# Patient Record
Sex: FEMALE | Race: WHITE | Employment: OTHER | URBAN - METROPOLITAN AREA
[De-identification: names, ages, dates, MRNs, and addresses within clinical notes are randomized per-mention and may not be internally consistent; named-entity substitution may affect disease eponyms.]

---

## 2019-10-02 ENCOUNTER — HOSPITAL ENCOUNTER (OUTPATIENT)
Dept: PHYSICAL THERAPY | Age: 44
Setting detail: THERAPIES SERIES
Discharge: HOME | End: 2019-10-02
Payer: COMMERCIAL

## 2019-10-02 PROCEDURE — 97162 PT EVAL MOD COMPLEX 30 MIN: CPT

## 2019-10-02 PROCEDURE — 97530 THERAPEUTIC ACTIVITIES: CPT

## 2019-10-02 PROCEDURE — 97140 MANUAL THERAPY 1/> REGIONS: CPT

## 2019-10-16 ENCOUNTER — HOSPITAL ENCOUNTER (OUTPATIENT)
Dept: PHYSICAL THERAPY | Age: 44
Setting detail: THERAPIES SERIES
Discharge: HOME | End: 2019-10-16
Payer: COMMERCIAL

## 2019-10-16 PROCEDURE — 97110 THERAPEUTIC EXERCISES: CPT

## 2019-10-16 PROCEDURE — 97112 NEUROMUSCULAR REEDUCATION: CPT

## 2019-10-16 PROCEDURE — 97140 MANUAL THERAPY 1/> REGIONS: CPT

## 2019-10-21 ENCOUNTER — HOSPITAL ENCOUNTER (OUTPATIENT)
Dept: PHYSICAL THERAPY | Age: 44
Setting detail: THERAPIES SERIES
Discharge: HOME | End: 2019-10-21
Payer: COMMERCIAL

## 2019-10-21 PROCEDURE — 97110 THERAPEUTIC EXERCISES: CPT

## 2019-10-21 PROCEDURE — 97112 NEUROMUSCULAR REEDUCATION: CPT

## 2019-10-21 PROCEDURE — 97140 MANUAL THERAPY 1/> REGIONS: CPT

## 2019-10-23 ENCOUNTER — HOSPITAL ENCOUNTER (OUTPATIENT)
Dept: PHYSICAL THERAPY | Age: 44
Setting detail: THERAPIES SERIES
Discharge: HOME | End: 2019-10-23
Payer: COMMERCIAL

## 2019-10-28 ENCOUNTER — APPOINTMENT (OUTPATIENT)
Dept: PHYSICAL THERAPY | Age: 44
End: 2019-10-28
Payer: COMMERCIAL

## 2019-10-30 ENCOUNTER — HOSPITAL ENCOUNTER (OUTPATIENT)
Dept: PHYSICAL THERAPY | Age: 44
Setting detail: THERAPIES SERIES
Discharge: HOME | End: 2019-10-30
Payer: COMMERCIAL

## 2019-10-30 PROCEDURE — 97110 THERAPEUTIC EXERCISES: CPT

## 2019-10-30 PROCEDURE — 97140 MANUAL THERAPY 1/> REGIONS: CPT

## 2019-10-30 PROCEDURE — 97112 NEUROMUSCULAR REEDUCATION: CPT

## 2019-11-04 ENCOUNTER — APPOINTMENT (OUTPATIENT)
Dept: PHYSICAL THERAPY | Age: 44
End: 2019-11-04
Payer: COMMERCIAL

## 2019-11-06 ENCOUNTER — HOSPITAL ENCOUNTER (OUTPATIENT)
Dept: PHYSICAL THERAPY | Age: 44
Setting detail: THERAPIES SERIES
Discharge: HOME | End: 2019-11-06
Payer: COMMERCIAL

## 2019-11-06 PROCEDURE — 97112 NEUROMUSCULAR REEDUCATION: CPT

## 2019-11-06 PROCEDURE — 97110 THERAPEUTIC EXERCISES: CPT

## 2019-11-06 PROCEDURE — 97140 MANUAL THERAPY 1/> REGIONS: CPT

## 2019-11-11 ENCOUNTER — APPOINTMENT (OUTPATIENT)
Dept: PHYSICAL THERAPY | Age: 44
End: 2019-11-11
Payer: COMMERCIAL

## 2019-11-13 ENCOUNTER — APPOINTMENT (OUTPATIENT)
Dept: PHYSICAL THERAPY | Age: 44
End: 2019-11-13
Payer: COMMERCIAL

## 2019-11-18 ENCOUNTER — HOSPITAL ENCOUNTER (OUTPATIENT)
Dept: PHYSICAL THERAPY | Age: 44
Setting detail: THERAPIES SERIES
Discharge: HOME | End: 2019-11-18
Payer: COMMERCIAL

## 2019-11-18 PROCEDURE — 97110 THERAPEUTIC EXERCISES: CPT

## 2019-11-18 PROCEDURE — 97140 MANUAL THERAPY 1/> REGIONS: CPT

## 2019-11-18 PROCEDURE — 97112 NEUROMUSCULAR REEDUCATION: CPT

## 2019-11-20 ENCOUNTER — HOSPITAL ENCOUNTER (OUTPATIENT)
Dept: PHYSICAL THERAPY | Age: 44
Setting detail: THERAPIES SERIES
Discharge: HOME | End: 2019-11-20
Payer: COMMERCIAL

## 2019-11-20 PROCEDURE — 97140 MANUAL THERAPY 1/> REGIONS: CPT

## 2019-11-20 PROCEDURE — 97112 NEUROMUSCULAR REEDUCATION: CPT

## 2019-11-20 PROCEDURE — 97110 THERAPEUTIC EXERCISES: CPT

## 2019-11-25 ENCOUNTER — HOSPITAL ENCOUNTER (OUTPATIENT)
Dept: PHYSICAL THERAPY | Age: 44
Setting detail: THERAPIES SERIES
Discharge: HOME | End: 2019-11-25
Payer: COMMERCIAL

## 2019-11-25 PROCEDURE — 97140 MANUAL THERAPY 1/> REGIONS: CPT

## 2019-11-25 PROCEDURE — 97110 THERAPEUTIC EXERCISES: CPT

## 2019-11-25 PROCEDURE — 97112 NEUROMUSCULAR REEDUCATION: CPT

## 2019-12-02 ENCOUNTER — HOSPITAL ENCOUNTER (OUTPATIENT)
Dept: PHYSICAL THERAPY | Age: 44
Setting detail: THERAPIES SERIES
Discharge: HOME | End: 2019-12-02
Payer: COMMERCIAL

## 2019-12-02 PROCEDURE — 97110 THERAPEUTIC EXERCISES: CPT

## 2019-12-02 PROCEDURE — G0283 ELEC STIM OTHER THAN WOUND: HCPCS

## 2019-12-02 PROCEDURE — 97140 MANUAL THERAPY 1/> REGIONS: CPT

## 2019-12-02 PROCEDURE — 97112 NEUROMUSCULAR REEDUCATION: CPT

## 2019-12-04 ENCOUNTER — APPOINTMENT (OUTPATIENT)
Dept: PHYSICAL THERAPY | Age: 44
End: 2019-12-04
Payer: COMMERCIAL

## 2019-12-06 ENCOUNTER — HOSPITAL ENCOUNTER (OUTPATIENT)
Dept: PHYSICAL THERAPY | Age: 44
Setting detail: THERAPIES SERIES
Discharge: HOME | End: 2019-12-06
Payer: COMMERCIAL

## 2019-12-06 PROCEDURE — 97110 THERAPEUTIC EXERCISES: CPT

## 2019-12-06 PROCEDURE — 97112 NEUROMUSCULAR REEDUCATION: CPT

## 2019-12-06 PROCEDURE — 97140 MANUAL THERAPY 1/> REGIONS: CPT

## 2019-12-06 PROCEDURE — G0283 ELEC STIM OTHER THAN WOUND: HCPCS

## 2019-12-09 ENCOUNTER — HOSPITAL ENCOUNTER (OUTPATIENT)
Dept: PHYSICAL THERAPY | Age: 44
Setting detail: THERAPIES SERIES
Discharge: HOME | End: 2019-12-09
Payer: COMMERCIAL

## 2019-12-09 PROCEDURE — 97140 MANUAL THERAPY 1/> REGIONS: CPT

## 2019-12-09 PROCEDURE — 97110 THERAPEUTIC EXERCISES: CPT

## 2019-12-09 PROCEDURE — 97112 NEUROMUSCULAR REEDUCATION: CPT

## 2019-12-11 ENCOUNTER — HOSPITAL ENCOUNTER (OUTPATIENT)
Dept: PHYSICAL THERAPY | Age: 44
Setting detail: THERAPIES SERIES
Discharge: HOME | End: 2019-12-11
Payer: COMMERCIAL

## 2019-12-11 PROCEDURE — 97112 NEUROMUSCULAR REEDUCATION: CPT

## 2019-12-11 PROCEDURE — 97110 THERAPEUTIC EXERCISES: CPT

## 2019-12-11 PROCEDURE — 97140 MANUAL THERAPY 1/> REGIONS: CPT

## 2019-12-16 ENCOUNTER — HOSPITAL ENCOUNTER (OUTPATIENT)
Dept: PHYSICAL THERAPY | Age: 44
Setting detail: THERAPIES SERIES
Discharge: HOME | End: 2019-12-16
Payer: COMMERCIAL

## 2020-01-07 ENCOUNTER — APPOINTMENT (OUTPATIENT)
Dept: PHYSICAL THERAPY | Age: 45
End: 2020-01-07
Payer: COMMERCIAL

## 2020-01-09 ENCOUNTER — HOSPITAL ENCOUNTER (OUTPATIENT)
Dept: PHYSICAL THERAPY | Age: 45
Setting detail: THERAPIES SERIES
Discharge: HOME | End: 2020-01-09
Payer: COMMERCIAL

## 2020-01-09 PROCEDURE — 97140 MANUAL THERAPY 1/> REGIONS: CPT

## 2020-01-09 PROCEDURE — 97112 NEUROMUSCULAR REEDUCATION: CPT

## 2020-01-09 PROCEDURE — 97530 THERAPEUTIC ACTIVITIES: CPT

## 2020-01-09 NOTE — FLOWSHEET NOTE
control in self care, mobility, lifting, ambulation and eccentric single leg control. [] UE / cervical: cervical, scapular, scapulothoracic and UE control with self care, reaching, carrying, lifting, house/yardwork, driving, computer work. NMR and Therapeutic Activities:    [x] (75379 or 92357) Provided verbal/tactile cueing for activities related to improving balance, coordination, kinesthetic sense, posture, motor skill, proprioception and motor activation to allow for proper function of   [] LE: / Lumbar core, proximal hip and LE with self care and ADLs  [x] UE / Cervical: cervical, postural, scapular, scapulothoracic and UE control with self care, carrying, lifting, driving, computer work.   [] (78502) Gait Re-education- Provided training and instruction to the patient for proper LE, core and proximal hip recruitment and positioning and eccentric body weight control with ambulation re-education including up and down stairs     Home Management Training / Self Care:  [] (26032) Provided self-care/home management training related to activities of daily living and compensatory training, and/or use of adaptive equipment for improvement with: ADLs and compensatory training, meal preparation, safety procedures and instruction in use of adaptive equipment, including bathing, grooming, dressing, personal hygiene, basic household cleaning and chores.      Home Exercise Program:    [x] (81766) Reviewed/Progressed HEP activities related to strengthening, flexibility, endurance, ROM of   [] LE / Lumbar: core, proximal hip and LE for functional self-care, mobility, lifting and ambulation/stair navigation   [x] UE / Cervical: cervical, postural, scapular, scapulothoracic and UE control with self care, reaching, carrying, lifting, house/yardwork, driving, computer work  [] (31056)Reviewed/Progressed HEP activities related to improving balance, coordination, kinesthetic sense, posture, motor skill, proprioception of   [] bending   []ADLs []Reaching  []Lifting  []Housework  []Driving []Job related tasks  []Sports/Recreation [x]Other: position changes, head movements      ASSESSMENT:  Patient able to resume Habituation activity with mild to moderate dizziness and head pressure. Pt admitted to not performing Otilith/Habituation stimulation while she was at home. Pt will continue to benefit from graded increase  In challenge with balance,manual therapy and Habituation activity. Treatment/Activity Tolerance:  [x] Patient able to complete tx  [] Patient limited by fatigue  [] Patient limited by pain  [] Patient limited by other medical complications  [x] Other: 12/2 pt with increase in pain today, did trial of estim with cp    Prognosis: [x] Good [] Fair  [] Poor    Patient Requires Follow-up: [x] Yes  [] No    Plan for next treatment session: habituation, head movement, manual therapy, cervical ROM & proprioception    PLAN: PT 2x / week for 6 weeks. Request 2x/week for 6 more weeks to meet goals  [x] Continue per plan of care [] Alter current plan (see comments)  [] Plan of care initiated [] Hold pending MD visit [] Discharge    Electronically signed by: Christine Peterson PT, DPT [id number]     Note: If patient does not return for scheduled/ recommended follow up visits, his note will serve as a discharge from care along with most recent update on progress.

## 2020-01-14 ENCOUNTER — HOSPITAL ENCOUNTER (OUTPATIENT)
Dept: PHYSICAL THERAPY | Age: 45
Setting detail: THERAPIES SERIES
Discharge: HOME | End: 2020-01-14
Payer: COMMERCIAL

## 2020-01-14 PROCEDURE — 97112 NEUROMUSCULAR REEDUCATION: CPT

## 2020-01-14 PROCEDURE — 97110 THERAPEUTIC EXERCISES: CPT

## 2020-01-14 PROCEDURE — 97140 MANUAL THERAPY 1/> REGIONS: CPT

## 2020-01-14 NOTE — FLOWSHEET NOTE
[facility] - Outpatient Physical Therapy              Physical Therapy Daily Treatment Note    Date:  2020    Patient Name:  Scott King    :  1975  MRN: [id number]  Medical/Treatment Diagnosis Information:  · Diagnosis: Cervical Craniosyndrome   · Treatment Diagnosis: Cervicalgia, Post-traumatic headache   Insurance/Certification information:  PT Insurance Information: [address]   Physician Information:  Referring Practitioner: Edi Lyons MD   Plan of care signed (Y/N): []  Yes [x]  No refaxed 19, refaxed , ,  faxed to number pt brought in see below  PT printed POC and PN for pt to take to MD appt for signature   pt brought contact info for MD phone [phone], fax [phone], Dept of Neuro, [address] [place] [address] [place], [address] [place] [address]    Date of Patient follow up with Physician:  19     Progress Report: []  Yes  [x]  No     Date Range for reporting period:   Beginning 10/2/19  Ending 19    Progress report due (10 Rx/or 30 days whichever is less): 20    Recertification due (POC duration/ or 90 days whichever is less):    Visit # Insurance Allowable Auth required? Date Range   3/12 2/30 []  Yes  [x]  No NA     Latex Allergy:  [x]NO      []YES  Preferred Language for Healthcare:   [x]English       []other:    Functional Scale: NDI: 16% Date assessed:   DGI     Pain level:  2/10 neck pain/head pressure    SUBJECTIVE:  Patient reports she is feeling about the same as she did last week. She states she hasn't noticed her head or neck pain as much today. The patient reports her dizziness is similar to a lightheadedness or increase in pressure, not spinning/vertigo.        OBJECTIVE: : PN cervical AROM flex 50, ext 42 with head pressure, sb L 15 increased head pressure, R 20, rot L50, R 54 some imrpovement with rot L  Palp L scalene mod increase tone and tender L UT, suboccipital min, NDI improved   10/16: SP with Target vert & horiz       11/18: slight imbalance  10/21 mild dizziness w/ vert     Head turn body turn with slight head extension     4 circles L/R each 1/14   Remembered Exercises (post-its on wall) @ 6 ft, 4 ft, 3 ft    Added 11/20: slight over-rotation to L as patient gets closer   VOR cancellation in tandem   2x10 horizontal 1/14   [facility]  / Nela Ball  + head/eyes forward        // bars:  Airex: narrowed LONDON  Head pitches/turns EO/ Then one foot on foam one on floor head nods and turns  Julianna Disc:  Half stance w/ head pithches/turn EO/ EC          Sways fwd/ bwd/lat EO/ EC        Manual Intervention (64639) - 12'              Cervical PROM (rotation, sidebend, flexion w/ rotation, sidebend + rotation opposite), manual cervical distraction, cervical sideglides (G3) B, capital flexion  12 min                             Pt. Education:  -patient educated on diagnosis, prognosis and expectations for rehab  -all patient questions were answered    HEP instruction:    12/2: green tband 20x high row, mid row, LPD 1x/day  11/18: handout for standing VOR 60 sec horiz and vertical 2' and 6' 2x/day by wall or counter for safety, and 360 turn L/R 2x by wall or counter  11/6: Plan to progress HEP with VOR, EC, turns  -patient provided with written and illustrated instructions for HEP:  LeVator stretch, cervical rotation, UT stretch, and CT    Therapeutic Exercise and NMR EXR  [x] (56382) Provided verbal/tactile cueing for activities related to strengthening, flexibility, endurance, ROM for improvements in  [] LE / Lumbar: LE, proximal hip, and core control with self care, mobility, lifting, ambulation. [x] UE / Cervical: cervical, postural, scapular, scapulothoracic and UE control with self care, reaching, carrying, lifting, house/yardwork, driving, computer work.   [x] (93970) Provided verbal/tactile cueing for activities related to improving balance, coordination, kinesthetic sense, posture, motor skill, proprioception to assist with   [] LE / lumbar: LE, proximal hip, and core control in self care, mobility, lifting, ambulation and eccentric single leg control. [x] UE / cervical: cervical, scapular, scapulothoracic and UE control with self care, reaching, carrying, lifting, house/yardwork, driving, computer work.   [] (70220) Therapist is in constant attendance of 2 or more patients providing skilled therapy interventions, but not providing any significant amount of measurable one-on-one time to either patient, for improvements in  [] LE / lumbar: LE, proximal hip, and core control in self care, mobility, lifting, ambulation and eccentric single leg control. [] UE / cervical: cervical, scapular, scapulothoracic and UE control with self care, reaching, carrying, lifting, house/yardwork, driving, computer work.      NMR and Therapeutic Activities:    [x] (78473 or 41226) Provided verbal/tactile cueing for activities related to improving balance, coordination, kinesthetic sense, posture, motor skill, proprioception and motor activation to allow for proper function of   [] LE: / Lumbar core, proximal hip and LE with self care and ADLs  [x] UE / Cervical: cervical, postural, scapular, scapulothoracic and UE control with self care, carrying, lifting, driving, computer work.   [] (39912) Gait Re-education- Provided training and instruction to the patient for proper LE, core and proximal hip recruitment and positioning and eccentric body weight control with ambulation re-education including up and down stairs     Home Management Training / Self Care:  [] (94744) Provided self-care/home management training related to activities of daily living and compensatory training, and/or use of adaptive equipment for improvement with: ADLs and compensatory training, meal preparation, safety procedures and instruction in use of adaptive equipment, including bathing, grooming, dressing, personal hygiene, basic household cleaning and patient. Juan Awad, SPT    Note: If patient does not return for scheduled/ recommended follow up visits, his note will serve as a discharge from care along with most recent update on progress.

## 2020-01-16 ENCOUNTER — HOSPITAL ENCOUNTER (OUTPATIENT)
Dept: PHYSICAL THERAPY | Age: 45
Setting detail: THERAPIES SERIES
Discharge: HOME | End: 2020-01-16
Payer: COMMERCIAL

## 2020-01-16 PROCEDURE — 97110 THERAPEUTIC EXERCISES: CPT

## 2020-01-16 PROCEDURE — 97140 MANUAL THERAPY 1/> REGIONS: CPT

## 2020-01-16 PROCEDURE — 97112 NEUROMUSCULAR REEDUCATION: CPT

## 2020-01-16 NOTE — FLOWSHEET NOTE
[facility] - Outpatient Physical Therapy              Physical Therapy Daily Treatment Note    Date:  2020    Patient Name:  Marina Booth    :  1975  MRN: [id number]  Medical/Treatment Diagnosis Information:  · Diagnosis: Cervical Craniosyndrome   · Treatment Diagnosis: Cervicalgia, Post-traumatic headache   Insurance/Certification information:  PT Insurance Information: [address]   Physician Information:  Referring Practitioner: Sandy Everett MD   Plan of care signed (Y/N): [x]  Yes []  No refaxed 19, refaxed , ,  faxed to number pt brought in see below, POC signed 19, PN signed   PT printed POC and PN for pt to take to MD appt for signature   pt brought contact info for MD phone [phone], fax [phone], Dept of Neuro, [address] [place] [address] [place], [address] [place] [address]     Date of Patient follow up with Physician:  19     Progress Report: []  Yes  [x]  No     Date Range for reporting period:   Beginning 10/2/19  Ending 19    Progress report due (10 Rx/or 30 days whichever is less): 20, #8 this this RX    Recertification due (POC duration/ or 90 days whichever is less):    Visit # Insurance Allowable Auth required? Date Range    3/12 3/30 []  Yes  [x]  No NA     Latex Allergy:  [x]NO      []YES  Preferred Language for Healthcare:   [x]English       []other:    Functional Scale: NDI: 16% Date assessed:   DGI     Pain level:  2/10 neck pain/head pressure    SUBJECTIVE:  Patient reports she is tired from not feeling well and didn't sleep great last night. She states she hasn't noticed her head or neck pain as much today. The patient reports her dizziness is similar to a lightheadedness or increase in pressure, not spinning/vertigo, today 0-2/10.        OBJECTIVE: : pt 7 min late  : PN cervical AROM flex 50, ext 42 with head pressure, sb L 15 increased head pressure, R 20, rot L50, R 54 some imrpovement with rot L  Palp L scalene mod increase tone and tender L UT, suboccipital min, NDI improved   10/16: SP (normal), saccades (normal), VOR horizontal (norm), VOR vertical (diff/dizziness), VOR cancellation (dizziness w/ vert, not with horizontal) - noted reduced cervical movement as testing progressed    RESTRICTIONS/PRECAUTIONS:     Exercises/Interventions:     Therapeutic Exercises (84413) - 15' Resistance / level Sets/sec Reps Notes   Cervical AROM: in ll bars  Rotation R/L  Sidebend R/L  Flex/Ext             Retro Scap Circles       UBE  L2 2 fwd/bwd=4min     Cervical stabilization:  Nods/turns with pillow on wall behind pt       Mid row  High row  LPD Blue T-band  20x 12/11 increased to Blue T-band. 1/14 adjusted sets and repetitions   W to Y  Rhomboid lift off facing the wall   2x10  10xAdded set 1/14   Seated Cervical Retraction    Retraction + 45deg Rotation R/L   Added 11/20: fingers for overpressure                 Therapeutic Activities (44116)                                          Neuromuscular Re-ed (86124) - 30'       DGI - performed 12/223Supine Chin Tuck & Lift  B lats stretch   10 x 10''3 slow    Trunk twist on airex   360 turn L/R   Shuttle ;  NBOS  Head pitches/turnsFwd step ups airex:Tandem corrective saccades horizontal      Airex FT EC head nods and turns, then one foot on foam one on floor same   10x B          10x ea 1/14   VOR vertical and horizontal head turns on airex. In tandem. 60\" vertical  60\" horizontal    VOR   vert & Horiz-airex: target 2'    Then VOR cancellation airex vert & Horiz    VOR x 2 w/ two targets    Airex: Remembered exercise w/ two targets                x10 12/9 Dizziness w/vert/ Horiz           Added 11/20    12/2 did dgi   Tandem stance on airex, head turn to line read.     10x L/r 10 vertical    Ambulation with VOR head turns horizontal and vertical   Target open field 50' forward and backward 1x ea    Ambulation w/ head turns/pitches  on Ramped surfaces fwd/bwd 1 lap 360 turn L/R 50'x2   Accommodation Exercise (arms length >> 8ft)    Added 11/25   Biodex:  Limits of Stability    Random Control  Added 11/25    Added 11/25          Remembered exercise  EC  with Target vert & horiz       11/18: slight imbalance  10/21 mild dizziness w/ vert     Head turn body turn with slight head extension    Airex: tandem trunk twist     L/R 4 circles       5x ea L and R foot forward    Remembered Exercises (post-its on wall) @ 6 ft, 4 ft, 3 ft    Added 11/20: slight over-rotation to L as patient gets closer   Airex: VOR cancellation in tandem    horizontal and vertical 10x ea    [facility]  / [facility]  + head/eyes forward        // bars:  Airex: narrowed LONDON  Head pitches/turns EO/ Then one foot on foam one on floor head nods and turns  Julianna Disc:  Half stance w/ head pithches/turn EO/ EC          Sways fwd/ bwd/lat EO/ EC        Manual Intervention (68783) - 12'              Cervical PROM (rotation, sidebend, flexion w/ rotation, sidebend + rotation opposite), manual cervical distraction, cervical sideglides (G3) B, capital flexion  10 min                             Pt. Education:  -patient educated on diagnosis, prognosis and expectations for rehab  -all patient questions were answered    HEP instruction:    12/2: green tband 20x high row, mid row, LPD 1x/day  11/18: handout for standing VOR 60 sec horiz and vertical 2' and 6' 2x/day by wall or counter for safety, and 360 turn L/R 2x by wall or counter  11/6: Plan to progress HEP with VOR, EC, turns  -patient provided with written and illustrated instructions for HEP:  LeVator stretch, cervical rotation, UT stretch, and CT    Therapeutic Exercise and NMR EXR  [x] (91802) Provided verbal/tactile cueing for activities related to strengthening, flexibility, endurance, ROM for improvements in  [] LE / Lumbar: LE, proximal hip, and core control with self care, mobility, lifting, ambulation.   [x] UE / Cervical: cervical, postural, scapular, scapulothoracic and UE control with self care, reaching, carrying, lifting, house/yardwork, driving, computer work. [x] (51240) Provided verbal/tactile cueing for activities related to improving balance, coordination, kinesthetic sense, posture, motor skill, proprioception to assist with   [] LE / lumbar: LE, proximal hip, and core control in self care, mobility, lifting, ambulation and eccentric single leg control. [x] UE / cervical: cervical, scapular, scapulothoracic and UE control with self care, reaching, carrying, lifting, house/yardwork, driving, computer work.   [] (77221) Therapist is in constant attendance of 2 or more patients providing skilled therapy interventions, but not providing any significant amount of measurable one-on-one time to either patient, for improvements in  [] LE / lumbar: LE, proximal hip, and core control in self care, mobility, lifting, ambulation and eccentric single leg control. [] UE / cervical: cervical, scapular, scapulothoracic and UE control with self care, reaching, carrying, lifting, house/yardwork, driving, computer work.      NMR and Therapeutic Activities:    [x] (05804 or 23904) Provided verbal/tactile cueing for activities related to improving balance, coordination, kinesthetic sense, posture, motor skill, proprioception and motor activation to allow for proper function of   [] LE: / Lumbar core, proximal hip and LE with self care and ADLs  [x] UE / Cervical: cervical, postural, scapular, scapulothoracic and UE control with self care, carrying, lifting, driving, computer work.   [] (80626) Gait Re-education- Provided training and instruction to the patient for proper LE, core and proximal hip recruitment and positioning and eccentric body weight control with ambulation re-education including up and down stairs     Home Management Training / Self Care:  [] (33565) Provided self-care/home management training related to activities of daily living and compensatory training, and/or use of adaptive equipment for improvement with: ADLs and compensatory training, meal preparation, safety procedures and instruction in use of adaptive equipment, including bathing, grooming, dressing, personal hygiene, basic household cleaning and chores. Home Exercise Program:    [] (11070) Reviewed/Progressed HEP activities related to strengthening, flexibility, endurance, ROM of   [] LE / Lumbar: core, proximal hip and LE for functional self-care, mobility, lifting and ambulation/stair navigation   [] UE / Cervical: cervical, postural, scapular, scapulothoracic and UE control with self care, reaching, carrying, lifting, house/yardwork, driving, computer work  [] (91420)Reviewed/Progressed HEP activities related to improving balance, coordination, kinesthetic sense, posture, motor skill, proprioception of   [] LE: core, proximal hip and LE for self care, mobility, lifting, and ambulation/stair navigation    [] UE / Cervical: cervical, postural,  scapular, scapulothoracic and UE control with self care, reaching, carrying, lifting, house/yardwork, driving, computer work    Manual Treatments:  PROM / STM / Oscillations-Mobs:  G-I, II, III, IV (PA's, Inf., Post.)  [x] (85301) Provided manual therapy to mobilize LE, proximal hip and/or LS spine soft tissue/joints for the purpose of modulating pain, promoting relaxation,  increasing ROM, reducing/eliminating soft tissue swelling/inflammation/restriction, improving soft tissue extensibility and allowing for proper ROM for normal function with   [] LE / lumbar: self care, mobility, lifting and ambulation. [x] UE / Cervical: self care, reaching, carrying, lifting, house/yardwork, driving, computer work.      Modalities:  [] (69134) Vasopneumatic compression: Utilized vasopneumatic compression to decrease edema / swelling for the purpose of improving mobility and quad tone / recruitment which will allow for increased overall function

## 2020-01-21 ENCOUNTER — HOSPITAL ENCOUNTER (OUTPATIENT)
Dept: PHYSICAL THERAPY | Age: 45
Setting detail: THERAPIES SERIES
Discharge: HOME | End: 2020-01-21
Payer: COMMERCIAL

## 2020-01-21 PROCEDURE — 97110 THERAPEUTIC EXERCISES: CPT

## 2020-01-21 PROCEDURE — 97112 NEUROMUSCULAR REEDUCATION: CPT

## 2020-01-21 PROCEDURE — 97140 MANUAL THERAPY 1/> REGIONS: CPT

## 2020-01-21 NOTE — FLOWSHEET NOTE
[facility] - Outpatient Physical Therapy              Physical Therapy Daily Treatment Note    Date:  2020    Patient Name:  Anupam Perales    :  1975  MRN: [id number]  Medical/Treatment Diagnosis Information:  · Diagnosis: Cervical Craniosyndrome   · Treatment Diagnosis: Cervicalgia, Post-traumatic headache   Insurance/Certification information:  PT Insurance Information: [address]   Physician Information:  Referring Practitioner: Stacia Nelson MD   Plan of care signed (Y/N): [x]  Yes []  No refaxed 19, refaxed , ,  faxed to number pt brought in see below, POC signed 19, PN signed   PT printed POC and PN for pt to take to MD appt for signature   pt brought contact info for MD phone [phone], fax [phone], Dept of Neuro, [address] [place] [address] [place], [address] [place] [address]     Date of Patient follow up with Physician:  19     Progress Report: []  Yes  [x]  No     Date Range for reporting period:   Beginning 10/2/19  Ending 19    Progress report due (10 Rx/or 30 days whichever is less): 20, #8 this this RX    Recertification due (POC duration/ or 90 days whichever is less):    Visit # Insurance Allowable Auth required? Date Range     []  Yes  [x]  No NA     Latex Allergy:  [x]NO      []YES  Preferred Language for Healthcare:   [x]English       []other:    Functional Scale: NDI: 16% Date assessed:   DGI     Pain level:  2/10 neck pain/shoulder pain    SUBJECTIVE:  Patient reports on the way over while driving in her car that she had some increased neck/shoulder tightness. She states that the head pressure is a little more than it was the last time she was in. Pt. Returns to classes on , all online.      OBJECTIVE:   : pt 7 min late  : PN cervical AROM flex 50, ext 42 with head pressure, sb L 15 increased head pressure, R 20, rot L50, R 54 some imrpovement with rot L  Palp L scalene mod lifting and ambulation. [x] UE / Cervical: self care, reaching, carrying, lifting, house/yardwork, driving, computer work. Modalities:  [] (17659) Vasopneumatic compression: Utilized vasopneumatic compression to decrease edema / swelling for the purpose of improving mobility and quad tone / recruitment which will allow for increased overall function including but not limited to self-care, transfers, ambulation, and ascending / descending stairs. Modalities:    12/6,  12/2: supine with lg roll under knees IF estim and CP L neck/hua with warm blanket 1/2 way thru checked on pt and turned up estim to tolerance  11/6: offered MHP, pt declined  10/16 - supine cervical MHP, legs up on bolster- 10'    Charges:  Timed Code Treatment Minutes: 56   Total Treatment Minutes: 56     [] EVAL - LOW (99606)   [] EVAL - MOD (29328)  [] EVAL - HIGH (01984)  [] RE-EVAL (68836)  [x] TE (86064) x 1     [] Ionto  [x] NMR (07866) x 2     [] Vaso  [x] Manual (44197) x  1     [] Ultrasound  [] TA x       [] Mech Traction (79495)  [] Gait Training x     [] ES (un) (81900):  [] Aquatic therapy x   [] Other:   [] Group:     GOALS:   Short Term Goals: To be achieved in: 2 weeks  1. Independent in HEP and progression per patient tolerance, in order to prevent re-injury. [] Progressing: [x] Met: [] Not Met: [] Adjusted  2. Patient will have a decrease in pain to facilitate improvement in movement, function, and ADLs as indicated by Functional Deficits. [] Progressing: [x] Met: [] Not Met: [] Adjusted     Long Term Goals: To be achieved in: 6 weeks / DC  1. Disability index score of 42% or less for the NDI to assist with reaching prior level of function. [] Progressing: [x] Met: [] Not Met: [] Adjusted  2. Patient will demonstrate increased AROM to [facility] of cervical  spine and good LS mobility,  patients Functional Deficits. [x] Progressing: [] Met: [] Not Met: [] Adjusted  3.  Patient will demonstrate an increase in postural demonstrate increased dizziness or slight loss of balance with more cancellation or full body turns to return to gaze stabilization on a cone. Treatment/Activity Tolerance:  [x] Patient able to complete tx  [] Patient limited by fatigue  [] Patient limited by pain  [] Patient limited by other medical complications  [] Other:     Prognosis: [x] Good [] Fair  [] Poor    Patient Requires Follow-up: [x] Yes  [] No    Plan for next treatment session: habituation, head movement, manual therapy, cervical ROM & proprioception. Dynamic balance with gaze stabilization. Return to classes and postural awareness. PLAN: PT 2x / week for 6 weeks. Request 2x/week for 6 more weeks to meet goals  [x] Continue per plan of care [] Alter current plan (see comments)  [] Plan of care initiated [] Hold pending MD visit [] Discharge    Electronically signed by: Niranjan Carrasquillo PT, DPT [id number]  Therapist was present, directed the patient's care, made skilled judgement, and was responsible for assessment and treatment of the patient. Juan Awad, SPT    Note: If patient does not return for scheduled/ recommended follow up visits, his note will serve as a discharge from care along with most recent update on progress.

## 2020-01-23 ENCOUNTER — HOSPITAL ENCOUNTER (OUTPATIENT)
Dept: PHYSICAL THERAPY | Age: 45
Setting detail: THERAPIES SERIES
Discharge: HOME | End: 2020-01-23
Payer: COMMERCIAL

## 2020-01-23 PROCEDURE — 97140 MANUAL THERAPY 1/> REGIONS: CPT

## 2020-01-23 PROCEDURE — 97112 NEUROMUSCULAR REEDUCATION: CPT

## 2020-01-23 PROCEDURE — 97530 THERAPEUTIC ACTIVITIES: CPT

## 2020-01-23 PROCEDURE — 97110 THERAPEUTIC EXERCISES: CPT

## 2020-01-23 PROCEDURE — 97116 GAIT TRAINING THERAPY: CPT

## 2020-01-23 NOTE — FLOWSHEET NOTE
[facility] - Outpatient Physical Therapy              Physical Therapy Daily Treatment Note    Date:  2020    Patient Name:  Amna New    :  1975  MRN: [id number]  Medical/Treatment Diagnosis Information:  · Diagnosis: Cervical Craniosyndrome   · Treatment Diagnosis: Cervicalgia, Post-traumatic headache   Insurance/Certification information:  PT Insurance Information: [employer]   Physician Information:  Referring Practitioner: Hyacinth Noel MD   Plan of care signed (Y/N): [x]  Yes []  No refaxed 19, refaxed , ,  faxed to number pt brought in see below, POC signed 19, PN signed   PT printed POC and PN for pt to take to MD appt for signature   pt brought contact info for MD phone [phone], fax [phone], Dept of Neuro, [address] [place] [address] [place]erto apple, [address] [place] [address]    Date of Patient follow up with Physician:  19     Progress Report: []  Yes  [x]  No     Date Range for reporting period:   Beginning 10/2/19  Ending 19    Progress report due (10 Rx/or 30 days whichever is less): 20, #8 this this RX    Recertification due (POC duration/ or 90 days whichever is less):    Visit # Insurance Allowable Auth required? Date Range     []  Yes  [x]  No NA     Latex Allergy:  [x]NO      []YES  Preferred Language for Healthcare:   [x]English       []other:    Functional Scale: NDI: 16% Date assessed:   DGI     Pain level:  2/10 neck pain/shoulder pain    SUBJECTIVE:  Patient reports that her head pressure and dizziness has on and off. Says the pressure in her head hasnt been as bad in intensity. Pt. Returns to classes on , all online.       OBJECTIVE:   : pt 7 min late  : PN cervical AROM flex 50, ext 42 with head pressure, sb L 15 increased head pressure, R 20, rot L50, R 54 some imrpovement with rot L  Palp L scalene mod increase tone and tender L UT, suboccipital min, NDI improved   10/16: SP (normal), saccades (normal), VOR horizontal (norm), VOR vertical (diff/dizziness), VOR cancellation (dizziness w/ vert, not with horizontal) - noted reduced cervical movement as testing progressed    RESTRICTIONS/PRECAUTIONS:     Exercises/Interventions:     Therapeutic Exercises (91002) - 12' Resistance / level Sets/sec Reps Notes   Cervical AROM: in ll bars  Rotation R/L  Sidebend R/L  Flex/Ext             Retro Scap Circles       UBE  L2 2 fwd/bwd=4min     Cervical stabilization:  Nods/turns with pillow on wall behind pt       Mid row    LPD Blue T-band  20x 12/11 increased to Blue T-band. 1/14 adjusted sets and repetitions   W to Y- added chin tuck 1/21  Rhomboid lift off facing the wall   2x10  1/23 Added set 1/14   Seated Cervical Retraction    Retraction + 45deg Rotation R/L   Added 11/20: fingers for overpressure   Horizontal abduction standing   Lime 2x10 B 1/21, 1/23   Ballet Bar  Thread the needle Cervical/shoulder      10x B   1/23   Therapeutic Activities (71670) 5'       [place] walking   2x15ft 1/21   [place] walking with ball toss                               Neuromuscular Re-ed (87565) - 27'       DGI - performed 12/223Supine Chin Tuck & Lift  - with BUE flexion    B lats stretch   10 x 10''5x 1/21  1/21   Trunk twist on airex   360 turn L/R   Shuttle ;  NBOS  Head pitches/turnsFwd step ups VOR vertical and horizontal head turns on airex. In tandem. 60\" vertical  60\" horizontal 1/21   VOR   vert & Horiz-airex: target 2'    Then VOR cancellation airex vert & Horiz    VOR x 2 w/ two targets    Airex: Remembered exercise w/ two targets                 12/9 Dizziness w/vert/ Horiz           Added 11/20 12/2 did dgi       Tandem on airex- corrective saccades    Tandem on airex- cancellation vert/horizontal     Tandem on airex. Ball toss outside of [place], with visual tracking.         2x10      2x10      20x anterior, 10 lateral on L, 10x lateral on R       1/21 1/21 1/21 Ambulation with VOR head turns horizontal and vertical   Target open field 50' forward and backward 3x ea 1/21- increased sets   Ambulation w/ head turns/pitches  on Ramped surfaces fwd/bwd     1 lap 360 turn L/R 50'x2   Accommodation Exercise (arms length >> 8ft)    Added 11/25   Biodex:  Limits of Stability    Random [id number]  1/21, 1/23    Added 11/25   Tandem walking with random cues for 360 turn and gaze stabilization back on target   3x15ft 1/21, 1/23   Remembered exercise  EC  with Target vert & horiz       11/18: slight imbalance  10/21 mild dizziness w/ vert     Head turn body turn with slight head extension    Airex: tandem trunk twist     L/R 4 circles        1/21, 1/23   Remembered Exercises (post-its on wall) @ 6 ft, 4 ft, 3 ft    Added 11/20: slight over-rotation to L as patient gets closer   Airex: VOR cancellation in tandem    horizontal and vertical 10x ea 1/23   [facility]  / Azbe Relic  + head/eyes forward        // bars:  Airex: narrowed [place]  Head pitches/turns EO/ Then one foot on foam one on floor head nods and turns  Julianna Disc:  Half stance w/ head pithches/turn EO/ EC          Sways fwd/ bwd/lat EO/ EC        Manual Intervention (10377) - 12'             Cervical PROM (rotation, sidebend, flexion w/ rotation, sidebend + rotation opposite), manual cervical distraction, cervical sideglides (G3) B, capital flexion              SOR, B UT, B levator, cervical traction, and capital flexion mobilization, capital flexion stretch  12'  1/21            Pt. Education:  -patient educated on diagnosis, prognosis and expectations for rehab  -all patient questions were answered    HEP instruction:    12/2: green tband 20x high row, mid row, LPD 1x/day  11/18: handout for standing VOR 60 sec horiz and vertical 2' and 6' 2x/day by wall or counter for safety, and 360 turn L/R 2x by wall or counter  11/6: Plan to progress HEP with VOR, EC, turns  -patient provided with written and illustrated in postural awareness and control and activation of deep cervical stabilizers to allow for proper functional mobility as indicated by patients Functional Deficits. [] Progressing: [x] Met: [] Not Met: [] Adjusted  4. Patient will return to functional activities including exercising without increased symptoms or restriction. [x] Progressing: [] Met: [] Not Met: [] Adjusted  5. Patient to report 0 dizziness and moderate to severe pressure in her head with lying down and rapid head movement. [x] Progressing: [] Met: [] Not Met: [] Adjusted             Overall Progression Towards Functional goals/ Treatment Progress Update:  [x] Patient is progressing as expected towards functional goals listed. [] Progression is slowed due to complexities/Impairments listed. [] Progression has been slowed due to co-morbidities. [] Plan just implemented, too soon to assess goals progression <30days   [] Goals require adjustment due to lack of progress  [] Patient is not progressing as expected and requires additional follow up with physician  [] Other    Persisting Functional Limitations/Impairments:  [x]Sleeping []Sitting               []Standing []Transfers        []Walking []Kneeling               []Stairs []Squatting / bending   []ADLs []Reaching  []Lifting  []Housework  []Driving []Job related tasks  []Sports/Recreation [x]Other: position changes, head movements      ASSESSMENT:  Patient was able to tolerate increase vestibular activities with gaze stabilization combined with balance activities . Pt experience mild dizziness with ramped surface change with head turns with minimal to no head pressure change in the head. Pt has noticeable tightness in posterior cervical upper traps/ suboccipital muscles with increase capital flexion. Pt will continue to benefit from Vestibular and balance activities combined with manual therapy. Will monitor pt's status as she will begin classes 1/27/20.      Treatment/Activity

## 2020-01-28 ENCOUNTER — HOSPITAL ENCOUNTER (OUTPATIENT)
Dept: PHYSICAL THERAPY | Age: 45
Setting detail: THERAPIES SERIES
Discharge: HOME | End: 2020-01-28
Payer: COMMERCIAL

## 2020-01-28 NOTE — PROGRESS NOTES
Physical Therapy  Cancellation/No-show Note  Patient Name:  Aislinn Lacey  :  1975   Date:  2020  Cancelled visits to date: 3  No-shows to date: 0    Patient status for today's appointment patient:  [x]  Cancelled 10/23, ,   []  Rescheduled appointment  []  No-show     Reason given by patient:  [x]  Patient ill  []  Conflicting appointment  []  No transportation    []  Conflict with work  []  No reason given  [x]  Other:    Car trouble    Comments:      Phone call information:   []  Phone call made today to patient at _ time at number provided:      []  Patient answered, conversation as follows:    []  Patient did not answer, message left as follows:  [x]  Phone call not made today     Electronically signed by:  Franco Koenig PT

## 2020-01-30 ENCOUNTER — HOSPITAL ENCOUNTER (OUTPATIENT)
Dept: PHYSICAL THERAPY | Age: 45
Setting detail: THERAPIES SERIES
Discharge: HOME | End: 2020-01-30
Payer: COMMERCIAL

## 2020-01-30 PROCEDURE — 97110 THERAPEUTIC EXERCISES: CPT

## 2020-01-30 PROCEDURE — 97112 NEUROMUSCULAR REEDUCATION: CPT

## 2020-01-30 PROCEDURE — 97140 MANUAL THERAPY 1/> REGIONS: CPT

## 2020-01-30 NOTE — FLOWSHEET NOTE
[facility] - Outpatient Physical Therapy              Physical Therapy Daily Treatment Note    Date:  2020    Patient Name:  Amna New    :  1975  MRN: [id number]  Medical/Treatment Diagnosis Information:  · Diagnosis: Cervical Craniosyndrome   · Treatment Diagnosis: Cervicalgia, Post-traumatic headache   Insurance/Certification information:  PT Insurance Information: [address]   Physician Information:  Referring Practitioner: Hyacinth Noel MD   Plan of care signed (Y/N): [x]  Yes []  No refaxed 19, refaxed , ,  faxed to number pt brought in see below, POC signed 19, PN signed   PT printed POC and PN for pt to take to MD appt for signature   pt brought contact info for MD phone [phone], fax [phone], Dept of Neuro, [address] [place] [address] [place], [address] [place] [address]     Date of Patient follow up with Physician:  19     Progress Report: []  Yes  [x]  No     Date Range for reporting period:   Beginning 10/2/19  Ending 19    Progress report due (10 Rx/or 30 days whichever is less): 20, #8 this this RX    Recertification due (POC duration/ or 90 days whichever is less):    Visit # Insurance Allowable Auth required? Date Range     []  Yes  [x]  No NA     Latex Allergy:  [x]NO      []YES  Preferred Language for Healthcare:   [x]English       []other:    Functional Scale: NDI: 16% Date assessed:   DGI     Pain level:  3/10 neck pain/shoulder pain    SUBJECTIVE:  Patient reports that she has been sick with head congestion, diahhrea. Pt would like to go easy today. Thought about cancelling. Head pressure/dizziness 0-1/10 today, pt just feels \"yucky\".     OBJECTIVE:   : pt 7 min late  : PN cervical AROM flex 50, ext 42 with head pressure, sb L 15 increased head pressure, R 20, rot L50, R 54 some imrpovement with rot L  Palp L scalene mod increase tone and tender L UT, suboccipital min, NDI improved   10/16: SP (normal), saccades (normal), VOR horizontal (norm), VOR vertical (diff/dizziness), VOR cancellation (dizziness w/ vert, not with horizontal) - noted reduced cervical movement as testing progressed    RESTRICTIONS/PRECAUTIONS:     Exercises/Interventions:     Therapeutic Exercises (18151) - 12' Resistance / level Sets/sec Reps Notes   Cervical AROM: standing  Rotation R/L  Sidebend R/L  Flex/Ext     2x ea    Retro Scap Circles   10x    UBE  L2 2 fwd/bwd=4min     Cervical stabilization:  Nods/turns with pillow on wall behind pt  Cervical ext luli    10xall   Mid row  High row  LPD  Horizontal abduction standing    Blue T-band      lime  20xea 12/11 increased to Blue T-band. 1/14 adjusted sets and repetitions   W to Y- added chin tuck   Rhomboid lift off facing the wall   10x  10x   Seated Cervical Retraction    Retraction + 45deg Rotation R/   Added 11/20: fingers for overpressure          Ballet Bar  Thread the needle Cervical/shoulder         1/23   Therapeutic Activities (29116) 5'       [place] walking    1/21   [place] walking with ball toss                               Neuromuscular Re-ed (54479) - 27'       DGI - performed 12/223Supine Chin Tuck & Lift  - with BUE flexion    B lats stretch    1/21 1/21   Trunk twist on airex   360 turn L/R   Shuttle ;  NBOS  Head pitches/turnsFwd step ups 2xAirex FT EC head nods and turns, then one foot on foam one on floor same    Trunk twist L/R 10x ea      10x 1/21   VOR   vert & Horiz-airex: target 2'       60''on ea             12/9 Dizziness w/vert/ Horiz           Added 11/20 12/2 did dgi       Tandem on airex- corrective saccades    Tandem on airex- cancellation vert/horizontal     Tandem on airex. Ball toss outside of [place], with visual tracking.                1/21 1/21 1/21          Ambulation with VOR head turns horizontal and vertical    1/21- increased sets   Ambulation w/ head turns/pitches  on Ramped surfaces fwd/bwd     1 lap 360 turn L/R 50'x2   Accommodation Exercise (arms length >> 8ft)    Added 11/25   Biodex:  Limits of Stability    Random Control  1/21, 1/23    Added 11/25   Tandem walking with random cues for 360 turn and gaze stabilization back on target    1/21, 1/23   Remembered exercise  EC  with Target vert & horiz       11/18: slight imbalance  10/21 mild dizziness w/ vert     Head turn body turn with slight head extension    Airex: tandem trunk twist             1/21, 1/23   Remembered Exercises (post-its on wall) @ 6 ft, 4 ft, 3 ft    Added 11/20: slight over-rotation to L as patient gets closer   Airex: VOR cancellation in tandem    1/23   [facility]  / Mignon Doctor  + head/eyes forward        // bars:  Airex: narrowed [place]  Head pitches/turns EO/ Then one foot on foam one on floor head nods and turns  Julianna Disc:  Half stance w/ head pithches/turn EO/ EC          Sways fwd/ bwd/lat EO/ EC        Manual Intervention (39166) - 12'             Cervical PROM (rotation, sidebend, flexion w/ rotation, sidebend + rotation opposite), manual cervical distraction, cervical sideglides (G3) B, capital flexion              SOR, B UT, B levator, cervical traction, and capital flexion mobilization, capital flexion stretch  15'              Pt. Education:  -patient educated on diagnosis, prognosis and expectations for rehab  -all patient questions were answered    HEP instruction:    12/2: green tband 20x high row, mid row, LPD 1x/day  11/18: handout for standing VOR 60 sec horiz and vertical 2' and 6' 2x/day by wall or counter for safety, and 360 turn L/R 2x by wall or counter  11/6: Plan to progress HEP with VOR, EC, turns  -patient provided with written and illustrated instructions for HEP:  LeVator stretch, cervical rotation, UT stretch, and CT    Therapeutic Exercise and NMR EXR  [x] (77527) Provided verbal/tactile cueing for activities related to strengthening, flexibility, endurance, ROM for improvements in  [] LE / Lumbar: LE, proximal hip, and core control with self care, mobility, lifting, ambulation. [x] UE / Cervical: cervical, postural, scapular, scapulothoracic and UE control with self care, reaching, carrying, lifting, house/yardwork, driving, computer work. [x] (46130) Provided verbal/tactile cueing for activities related to improving balance, coordination, kinesthetic sense, posture, motor skill, proprioception to assist with   [] LE / lumbar: LE, proximal hip, and core control in self care, mobility, lifting, ambulation and eccentric single leg control. [x] UE / cervical: cervical, scapular, scapulothoracic and UE control with self care, reaching, carrying, lifting, house/yardwork, driving, computer work.   [] (27203) Therapist is in constant attendance of 2 or more patients providing skilled therapy interventions, but not providing any significant amount of measurable one-on-one time to either patient, for improvements in  [] LE / lumbar: LE, proximal hip, and core control in self care, mobility, lifting, ambulation and eccentric single leg control. [] UE / cervical: cervical, scapular, scapulothoracic and UE control with self care, reaching, carrying, lifting, house/yardwork, driving, computer work.      NMR and Therapeutic Activities:    [x] (27854 or 20899) Provided verbal/tactile cueing for activities related to improving balance, coordination, kinesthetic sense, posture, motor skill, proprioception and motor activation to allow for proper function of   [] LE: / Lumbar core, proximal hip and LE with self care and ADLs  [x] UE / Cervical: cervical, postural, scapular, scapulothoracic and UE control with self care, carrying, lifting, driving, computer work.   [] (11015) Gait Re-education- Provided training and instruction to the patient for proper LE, core and proximal hip recruitment and positioning and eccentric body weight control with ambulation re-education including up and down stairs     Home Management Training / purpose of improving mobility and quad tone / recruitment which will allow for increased overall function including but not limited to self-care, transfers, ambulation, and ascending / descending stairs. Modalities:    12/6,  12/2: supine with lg roll under knees IF estim and CP L neck/hua with warm blanket 1/2 way thru checked on pt and turned up estim to tolerance  11/6: offered MHP, pt declined  10/16 - supine cervical MHP, legs up on bolster- 10'    Charges:  Timed Code Treatment Minutes: 53   Total Treatment Minutes: 53     [] EVAL - LOW (51285)   [] EVAL - MOD (67545)  [] EVAL - HIGH (52790)  [] RE-EVAL (12265)  [x] TE (97862) x 2     [] Ionto  [x] NMR (55555) x 1     [] Vaso  [x] Manual (24931) x  1     [] Ultrasound  [] TA x       [] Mech Traction (68433)  [] Gait Training x     [] ES (un) (85945):  [] Aquatic therapy x   [] Other:   [] Group:     GOALS:   Short Term Goals: To be achieved in: 2 weeks  1. Independent in HEP and progression per patient tolerance, in order to prevent re-injury. [] Progressing: [x] Met: [] Not Met: [] Adjusted  2. Patient will have a decrease in pain to facilitate improvement in movement, function, and ADLs as indicated by Functional Deficits. [] Progressing: [x] Met: [] Not Met: [] Adjusted     Long Term Goals: To be achieved in: 6 weeks / DC  1. Disability index score of 42% or less for the NDI to assist with reaching prior level of function. [] Progressing: [x] Met: [] Not Met: [] Adjusted  2. Patient will demonstrate increased AROM to [facility] of cervical  spine and good LS mobility,  patients Functional Deficits. [x] Progressing: [] Met: [] Not Met: [] Adjusted  3. Patient will demonstrate an increase in postural awareness and control and activation of deep cervical stabilizers to allow for proper functional mobility as indicated by patients Functional Deficits. [] Progressing: [x] Met: [] Not Met: [] Adjusted  4.  Patient will return to functional activities

## 2020-02-04 ENCOUNTER — HOSPITAL ENCOUNTER (OUTPATIENT)
Dept: PHYSICAL THERAPY | Age: 45
Setting detail: THERAPIES SERIES
Discharge: HOME | End: 2020-02-04
Payer: COMMERCIAL

## 2020-02-04 PROCEDURE — 97110 THERAPEUTIC EXERCISES: CPT

## 2020-02-04 PROCEDURE — 97112 NEUROMUSCULAR REEDUCATION: CPT

## 2020-02-04 PROCEDURE — 97140 MANUAL THERAPY 1/> REGIONS: CPT

## 2020-02-04 NOTE — FLOWSHEET NOTE
AROM flex 50, ext 42 with head pressure, sb L 15 increased head pressure, R 20, rot L50, R 54 some imrpovement with rot L  Palp L scalene mod increase tone and tender L UT, suboccipital min, NDI improved   10/16: SP (normal), saccades (normal), VOR horizontal (norm), VOR vertical (diff/dizziness), VOR cancellation (dizziness w/ vert, not with horizontal) - noted reduced cervical movement as testing progressed    RESTRICTIONS/PRECAUTIONS:     Exercises/Interventions:     Therapeutic Exercises (38872) - 16' Resistance / level Sets/sec Reps Notes   Cervical AROM: standing  Rotation R/L  Sidebend R/L  Flex/Ext     2x ea    Retro Scap Circles   10x    UBE  L2 2 fwd/bwd=4min  2/4   Cervical stabilization:  Nods/turns with pillow on wall behind pt  Cervical ext luli    10xall   Mid row  High row  LPD  Horizontal abduction standing    Blue T-band      lime  3x10      2x10 12/11 increased to Blue T-band. 1/14 adjusted sets and repetitions, 2/4 increased sets and added chin tuck.    W to Y- added chin tuck   Rhomboid lift off facing the wall   10x  10x   Seated Cervical Retraction    Retraction + 45deg Rotation R/   Added 11/20: fingers for overpressure   qped on SB- chin tuck and prone T bilateral   15x 2/4   [employer]  Thread the needle Cervical/shoulder         1/23          Therapeutic Activities (17324) 5'       [place] walking    1/21   [place] walking with ball toss   Alt step taps with VOR cancellation       Tandem walking with random cues for 360 turn and gaze stabilization back on target   4 laps 2/4   Alt step taps with VOR cancellation- vertical    2x15 2/4          Neuromuscular Re-ed (70845) - 27'       DGI - performed 12/223Supine Chin Tuck & Lift  - with BUE flexion    B lats stretch    1/21 1/21   Trunk twist on airex   360 turn L/R   Shuttle ;  NBOS  Head pitches/turns- IN TANDEMFwd step ups 2x10 ea2/4Airex FT EC head nods and turns, then one foot on foam one on floor same    Trunk twist L/R 10x answered    HEP instruction:    12/2: green tband 20x high row, mid row, LPD 1x/day  11/18: handout for standing VOR 60 sec horiz and vertical 2' and 6' 2x/day by wall or counter for safety, and 360 turn L/R 2x by wall or counter  11/6: Plan to progress HEP with VOR, EC, turns  -patient provided with written and illustrated instructions for HEP:  LeVator stretch, cervical rotation, UT stretch, and CT    Therapeutic Exercise and NMR EXR  [x] (77725) Provided verbal/tactile cueing for activities related to strengthening, flexibility, endurance, ROM for improvements in  [] LE / Lumbar: LE, proximal hip, and core control with self care, mobility, lifting, ambulation. [x] UE / Cervical: cervical, postural, scapular, scapulothoracic and UE control with self care, reaching, carrying, lifting, house/yardwork, driving, computer work. [x] (71448) Provided verbal/tactile cueing for activities related to improving balance, coordination, kinesthetic sense, posture, motor skill, proprioception to assist with   [] LE / lumbar: LE, proximal hip, and core control in self care, mobility, lifting, ambulation and eccentric single leg control. [x] UE / cervical: cervical, scapular, scapulothoracic and UE control with self care, reaching, carrying, lifting, house/yardwork, driving, computer work.   [] (85494) Therapist is in constant attendance of 2 or more patients providing skilled therapy interventions, but not providing any significant amount of measurable one-on-one time to either patient, for improvements in  [] LE / lumbar: LE, proximal hip, and core control in self care, mobility, lifting, ambulation and eccentric single leg control. [] UE / cervical: cervical, scapular, scapulothoracic and UE control with self care, reaching, carrying, lifting, house/yardwork, driving, computer work.      NMR and Therapeutic Activities:    [x] (26142 or 54450) Provided verbal/tactile cueing for activities related to improving balance, less for the NDI to assist with reaching prior level of function. [] Progressing: [x] Met: [] Not Met: [] Adjusted  2. Patient will demonstrate increased AROM to [facility] of cervical  spine and good LS mobility,  patients Functional Deficits. [x] Progressing: [] Met: [] Not Met: [] Adjusted  3. Patient will demonstrate an increase in postural awareness and control and activation of deep cervical stabilizers to allow for proper functional mobility as indicated by patients Functional Deficits. [] Progressing: [x] Met: [] Not Met: [] Adjusted  4. Patient will return to functional activities including exercising without increased symptoms or restriction. [x] Progressing: [] Met: [] Not Met: [] Adjusted  5. Patient to report 0 dizziness and moderate to severe pressure in her head with lying down and rapid head movement. [x] Progressing: [] Met: [] Not Met: [] Adjusted             Overall Progression Towards Functional goals/ Treatment Progress Update:  [x] Patient is progressing as expected towards functional goals listed. [] Progression is slowed due to complexities/Impairments listed. [] Progression has been slowed due to co-morbidities. [] Plan just implemented, too soon to assess goals progression <30days   [] Goals require adjustment due to lack of progress  [] Patient is not progressing as expected and requires additional follow up with physician  [] Other     Persisting Functional Limitations/Impairments:  [x]Sleeping []Sitting               []Standing []Transfers        []Walking []Kneeling               []Stairs []Squatting / bending   []ADLs []Reaching  []Lifting  []Housework  []Driving []Job related tasks  []Sports/Recreation [x]Other: position changes, head movements      ASSESSMENT:  Patient demonstrates moderate sway with tandem cancellation on airex. The patient continued to report her neck as feeling less stiff this visit.  The patient continues demonstrate improved balance corrections and postural correction with different activities. The patient's tolerance vestibular challenges was good this visit, with dizziness mainly increasing following full rotations while ambulating. The patient continues to feel better with her balance and has less feelings of head pressure with the assistance of PT. Treatment/Activity Tolerance:  [x] Patient able to complete tx  [] Patient limited by fatigue  [] Patient limited by pain  [] Patient limited by other medical complications  [] Other:     Prognosis: [x] Good [] Fair  [] Poor    Patient Requires Follow-up: [x] Yes  [] No    Plan for next treatment session: habituation, head movement, manual therapy, cervical ROM & proprioception. Dynamic balance with gaze stabilization. Return to classes and postural awareness. PLAN: PT 2x / week for 6 weeks. Request 2x/week for 6 more weeks to meet goals  [x] Continue per plan of care [] Alter current plan (see comments)  [] Plan of care initiated [] Hold pending MD visit [] Discharge    Electronically signed by: Yulisa Nicole PT, DPT [id number]  Therapist was present, directed the patient's care, made skilled judgement, and was responsible for assessment and treatment of the patient. Juan Awad, SPT    Note: If patient does not return for scheduled/ recommended follow up visits, his note will serve as a discharge from care along with most recent update on progress.

## 2020-02-06 ENCOUNTER — HOSPITAL ENCOUNTER (OUTPATIENT)
Dept: PHYSICAL THERAPY | Age: 45
Setting detail: THERAPIES SERIES
Discharge: HOME | End: 2020-02-06
Payer: COMMERCIAL

## 2020-02-06 PROCEDURE — 97110 THERAPEUTIC EXERCISES: CPT

## 2020-02-06 PROCEDURE — 97140 MANUAL THERAPY 1/> REGIONS: CPT

## 2020-02-06 PROCEDURE — 97112 NEUROMUSCULAR REEDUCATION: CPT

## 2020-02-11 ENCOUNTER — HOSPITAL ENCOUNTER (OUTPATIENT)
Dept: PHYSICAL THERAPY | Age: 45
Setting detail: THERAPIES SERIES
Discharge: HOME | End: 2020-02-11
Payer: COMMERCIAL

## 2020-02-11 PROCEDURE — 97110 THERAPEUTIC EXERCISES: CPT

## 2020-02-11 PROCEDURE — 97112 NEUROMUSCULAR REEDUCATION: CPT

## 2020-02-11 PROCEDURE — 97140 MANUAL THERAPY 1/> REGIONS: CPT

## 2020-02-11 NOTE — FLOWSHEET NOTE
[facility] - Outpatient Physical Therapy              Physical Therapy Daily Treatment Note    Date:  2020    Patient Name:  Huy Carreon    :  1975  MRN: [id number]  Medical/Treatment Diagnosis Information:  · Diagnosis: Cervical Craniosyndrome   · Treatment Diagnosis: Cervicalgia, Post-traumatic headache   Insurance/Certification information:  PT Insurance Information: [address]   Physician Information:  Referring Practitioner: Ciera Dodson MD   Plan of care signed (Y/N): [x]  Yes []  No refaxed 19, refaxed , ,  faxed to number pt brought in see below, POC signed 19, PN signed   PT printed POC and PN for pt to take to MD appt for signature   pt brought contact info for MD phone [phone], fax [phone], Dept of Neuro, [address] [place] [address] [place], [address] [place] [address]     Date of Patient follow up with Physician:  19     Progress Report: [x]  Yes  []  No     Date Range for reporting period:   Beginning 10/2/19  Ending 19    Progress report due (10 Rx/or 30 days whichever is less): 20, #8 this this RX    Recertification due (POC duration/ or 90 days whichever is less):    Visit # Insurance Allowable Auth required? Date Range     []  Yes  [x]  No NA     Latex Allergy:  [x]NO      []YES  Preferred Language for Healthcare:   [x]English       []other:    Functional Scale: NDI: 16% Date assessed:   DGI     Pain level:  1-2/10 neck pain/shoulder pain    SUBJECTIVE:  Patient reports she has been dizzy and not feeling well in last 3 days, not sure why. Dizziness today 3/10, starting Margarito 6/10 with pressure, having imbalance.     OBJECTIVE:     4SST- 15 sec on first attempt, 12 sec on 2nd attempt  : pt 7 min late  : PN cervical AROM flex 50, ext 42 with head pressure, sb L 15 increased head pressure, R 20, rot L50, R 54 some imrpovement with rot L  Palp L scalene mod increase tone and tender L UT, suboccipital min, NDI improved   10/16: SP (normal), saccades (normal), VOR horizontal (norm), VOR vertical (diff/dizziness), VOR cancellation (dizziness w/ vert, not with horizontal) - noted reduced cervical movement as testing progressed    RESTRICTIONS/PRECAUTIONS:     Exercises/Interventions:     Therapeutic Exercises (45846) - 16' Resistance / level Sets/sec Reps Notes   Cervical AROM: standing  Rotation R/L  Sidebend R/L  Flex/Ext         Retro Scap Circles       UBE  L2 2 fwd/bwd=4min  2/4   Cervical stabilization:  Nods/turns with pillow on wall behind pt  Cervical ext luli       Mid row  High row  LPD  Horizontal abduction standing    Blue T-band      lime  3x10      2x10 12/11 increased to Blue T-band. 1/14 adjusted sets and repetitions, 2/4 increased sets and added chin tuck.    W to Y- added chin tuck   Rhomboid lift off facing the wall   10x  10x   Seated Cervical Retraction    Retraction + 45deg Rotation R/   Added 11/20: fingers for overpressure   qped on SB- chin tuck and prone T bilateral   15x 2/4   [employer]  Thread the needle Cervical/shoulder         1/23          Therapeutic Activities (57492) 5'       [place] walking    1/21   [place] walking with ball toss   Alt step taps with VOR cancellation       Tandem walking with random cues for 360 turn and gaze stabilization back on target   2/4   Alt step taps with VOR cancellation- vertical    2/4          Neuromuscular Re-ed (71885) - 27'       DGI - performed 12/223Supine Chin Tuck & Lift  - with BUE flexion    B lats stretch    1/21 1/21   Trunk twist on airex   360 turn L/R   Shuttle ;  NBOS  Head pitches/turns- IN TANDEMFwd step ups to SLS   VOR cancellation w/ ball in hand A/P and M/L2x10 ea10B2/4  2/6Airex FT EC head nods and turns, then one foot on foam one on floor same    Trunk twist L/R on [id number] ea      10x 1/21   VOR   vert & Horiz-airex: target 2'       60''on ea             12/9 Dizziness w/vert/ Horiz           Added 6' 2x/day by wall or counter for safety, and 360 turn L/R 2x by wall or counter  11/6: Plan to progress HEP with VOR, EC, turns  -patient provided with written and illustrated instructions for HEP:  LeVator stretch, cervical rotation, UT stretch, and CT    Therapeutic Exercise and NMR EXR  [x] (09758) Provided verbal/tactile cueing for activities related to strengthening, flexibility, endurance, ROM for improvements in  [] LE / Lumbar: LE, proximal hip, and core control with self care, mobility, lifting, ambulation. [x] UE / Cervical: cervical, postural, scapular, scapulothoracic and UE control with self care, reaching, carrying, lifting, house/yardwork, driving, computer work. [x] (13486) Provided verbal/tactile cueing for activities related to improving balance, coordination, kinesthetic sense, posture, motor skill, proprioception to assist with   [] LE / lumbar: LE, proximal hip, and core control in self care, mobility, lifting, ambulation and eccentric single leg control. [x] UE / cervical: cervical, scapular, scapulothoracic and UE control with self care, reaching, carrying, lifting, house/yardwork, driving, computer work.   [] (30619) Therapist is in constant attendance of 2 or more patients providing skilled therapy interventions, but not providing any significant amount of measurable one-on-one time to either patient, for improvements in  [] LE / lumbar: LE, proximal hip, and core control in self care, mobility, lifting, ambulation and eccentric single leg control. [] UE / cervical: cervical, scapular, scapulothoracic and UE control with self care, reaching, carrying, lifting, house/yardwork, driving, computer work.      NMR and Therapeutic Activities:    [x] (87051 or 53072) Provided verbal/tactile cueing for activities related to improving balance, coordination, kinesthetic sense, posture, motor skill, proprioception and motor activation to allow for proper function of   [] LE: / Lumbar core, proximal hip and LE with self care and ADLs  [x] UE / Cervical: cervical, postural, scapular, scapulothoracic and UE control with self care, carrying, lifting, driving, computer work.   [] (40459) Gait Re-education- Provided training and instruction to the patient for proper LE, core and proximal hip recruitment and positioning and eccentric body weight control with ambulation re-education including up and down stairs     Home Management Training / Self Care:  [] (67828) Provided self-care/home management training related to activities of daily living and compensatory training, and/or use of adaptive equipment for improvement with: ADLs and compensatory training, meal preparation, safety procedures and instruction in use of adaptive equipment, including bathing, grooming, dressing, personal hygiene, basic household cleaning and chores.      Home Exercise Program:    [] (73417) Reviewed/Progressed HEP activities related to strengthening, flexibility, endurance, ROM of   [] LE / Lumbar: core, proximal hip and LE for functional self-care, mobility, lifting and ambulation/stair navigation   [] UE / Cervical: cervical, postural, scapular, scapulothoracic and UE control with self care, reaching, carrying, lifting, house/yardwork, driving, computer work  [] (15998)Reviewed/Progressed HEP activities related to improving balance, coordination, kinesthetic sense, posture, motor skill, proprioception of   [] LE: core, proximal hip and LE for self care, mobility, lifting, and ambulation/stair navigation    [] UE / Cervical: cervical, postural,  scapular, scapulothoracic and UE control with self care, reaching, carrying, lifting, house/yardwork, driving, computer work    Manual Treatments:  PROM / STM / Oscillations-Mobs:  G-I, II, III, IV (PA's, Inf., Post.)  [x] (90372) Provided manual therapy to mobilize LE, proximal hip and/or LS spine soft tissue/joints for the purpose of modulating pain, promoting relaxation,  increasing ROM, Tolerance:  [x] Patient able to complete tx  [] Patient limited by fatigue  [] Patient limited by pain  [] Patient limited by other medical complications  [] Other:     Prognosis: [x] Good [] Fair  [] Poor    Patient Requires Follow-up: [x] Yes  [] No    Plan for next treatment session: habituation, head movement, manual therapy, cervical ROM & proprioception. Dynamic balance with gaze stabilization. Return to classes and postural awareness. PLAN: PT 2x / week for 6 weeks. Request 2x/week for 6 more weeks to meet goals  [x] Continue per plan of care [] Alter current plan (see comments)  [] Plan of care initiated [] Hold pending MD visit [] Discharge    Electronically signed by: Corwin Jimenez PT, DPT [id number]      Note: If patient does not return for scheduled/ recommended follow up visits, his note will serve as a discharge from care along with most recent update on progress.

## 2020-02-11 NOTE — FLOWSHEET NOTE
[facility] - Outpatient Physical Therapy              Physical Therapy Daily Treatment and Progress Note    Date:  2020    Patient Name:  Kristin Sr    :  1975  MRN: [id number]  Medical/Treatment Diagnosis Information:  · Diagnosis: Cervical Craniosyndrome   · Treatment Diagnosis: Cervicalgia, Post-traumatic headache   Insurance/Certification information:  PT Insurance Information: [address]   Physician Information:  Referring Practitioner: Wesly Porter MD   Plan of care signed (Y/N): [x]  Yes []  No refaxed 19, refaxed , ,  faxed to number pt brought in see below, POC signed 19, PN signed   PT printed POC and PN for pt to take to MD appt for signature   pt brought contact info for MD phone [phone], fax [phone], Dept of Neuro, [address] [place] [address] [place]erto apple, [address] [place] [address]    Date of Patient follow up with Physician:  19     Progress Report: [x]  Yes  []  No     Date Range for reporting period:   Beginning 10/2/19  Ending 19    Progress report due (10 Rx/or 30 days whichever is less): 24    Recertification due (POC duration/ or 90 days whichever is less):    Visit # Insurance Allowable Auth required? Date Range     []  Yes  [x]  No NA     Latex Allergy:  [x]NO      []YES  Preferred Language for Healthcare:   [x]English       []other:    Functional Scale: NDI: 15% Date assessed:   DGI     Pain level:  1-2/10 neck pain/shoulder pain    SUBJECTIVE:  Patient reports that she had a flare up since  dizziness was 6/10 and head pressure, today dizziness 3/10. Pt would like to come the rest of visits and then return to MD. Pt has only taken meds 1x this week, but prior to that hadn't taken for 2 weeks.     OBJECTIVE:  20 see PN info cervical AROM flex 50, ext 45, rot20, sb 54, palp mm min/mod increase tone and tender  / 4SST- 15 sec on first attempt, 12 sec on 2nd attempt  : pt 7 min late  12/6: PN cervical AROM flex 50, ext 42 with head pressure, sb L 15 increased head pressure, R 20, rot L50, R 54 some imrpovement with rot L  Palp L scalene mod increase tone and tender L UT, suboccipital min, NDI improved   10/16: SP (normal), saccades (normal), VOR horizontal (norm), VOR vertical (diff/dizziness), VOR cancellation (dizziness w/ vert, not with horizontal) - noted reduced cervical movement as testing progressed    RESTRICTIONS/PRECAUTIONS:     Exercises/Interventions:     Therapeutic Exercises (35416) - 16' Resistance / level Sets/sec Reps Notes   Cervical AROM: standing  Rotation R/L  Sidebend R/L  Flex/Ext     2x ea    Retro Scap Circles       UBE  L2 2 fwd/bwd=4min  2/4   Cervical stabilization:  Nods/turns with pillow on wall behind pt  Cervical ext luli       Mid row  High row  LPD  Horizontal abduction standing    Blue T-band      lime  3x10      2x10 12/11 increased to Blue T-band. 1/14 adjusted sets and repetitions, 2/4 increased sets and added chin tuck.    W to Y- added chin tuck   Rhomboid lift off facing the wall   10x  10x   Seated Cervical Retraction    Retraction + 45deg Rotation R/   Added 11/20: fingers for overpressure   qped on SB- chin tuck and prone T bilateral    2/4   Ballet Bar  Thread the needle Cervical/shoulder         1/23          Therapeutic Activities (11643) 5'       [place] walking    1/21   [place] walking with ball toss   Alt step taps with VOR cancellation       Tandem walking with random cues for 360 turn and gaze stabilization back on target   2/4   Alt step taps with VOR cancellation- vertical    2/4          Neuromuscular Re-ed (43213) - 27'       DGI - performed 12/223Supine Chin Tuck & Lift  - with BUE flexion    B lats stretch   10 x 10''  5x  3 slow 1/21 1/21   Trunk twist on airex   360 turn L/R   10x L/R  2x2/4  2/6Airex FT EC head nods and turns, then one foot on foam one on floor same    Trunk twist L/R on [id number] ea      10x VOR vertical and horizontal head turns on airex60\" vertical  60\" horizontal1/21   VOR   vert & Horiz-airex: target 2'       60''on ea             12/9 Dizziness w/vert/ Horiz           Added 11/20 12/2 did dgi       Tandem on airex- corrective saccades    Tandem on airex- cancellation vert/horizontal     Tandem on airex. Ball toss outside of LONDON, with visual tracking.                1/21 1/21 1/21          Ambulation with VOR head turns, fwd and bckwd   horizontal and vertical-tandem    1/21- increased sets   [facility] :  Ambulation w/ head turns/pitches  on Ramped surfaces fwd/bwd     1 lap 360 turn L/R 50'x2   x2 laps2x eaAccommodation Exercise (arms length >> 8ft)    Added 11/25   [facility]:  Limits of Stability    Random Control  1/21, 1/23    Added 11/25 1/21, 1/23   Remembered exercise  EC  with Target vert & horiz       11/18: slight imbalance  10/21 mild dizziness w/ vert     Head turn body turn with slight head extension    Airex: tandem trunk twist             1/21, 1/23   Remembered Exercises (post-its on wall) @ 6 ft, 4 ft, 3 ft    Added 11/20: slight over-rotation to L as patient gets closer   Airex: VOR cancellation in tandem    horizontal and vertical 10x ea 1/23   [facility]  / Maryann Wrighter  + head/eyes forward        // bars:  Airex: narrowed LONDON  Head pitches/turns EO/ Then one foot on foam one on floor head nods and turns  Julianna Disc:  Half stance w/ head pithches/turn EO/ EC          Sways fwd/ bwd/lat EO/ EC               Manual Intervention (03164) - 10'             Cervical PROM (rotation, sidebend, flexion w/ rotation, sidebend + rotation opposite), manual cervical distraction, cervical sideglides (G3) B, capital flexion              SOR, B UT, B levator, cervical traction, and capital flexion mobilization, capital flexion stretch  10'              Pt. Education:  -patient educated on diagnosis, prognosis and expectations for rehab  -all patient questions were answered    HEP instruction: 12/2: green tband 20x high row, mid row, LPD 1x/day  11/18: handout for standing VOR 60 sec horiz and vertical 2' and 6' 2x/day by wall or counter for safety, and 360 turn L/R 2x by wall or counter  11/6: Plan to progress HEP with VOR, EC, turns  -patient provided with written and illustrated instructions for HEP:  LeVator stretch, cervical rotation, UT stretch, and CT    Therapeutic Exercise and NMR EXR  [x] (21635) Provided verbal/tactile cueing for activities related to strengthening, flexibility, endurance, ROM for improvements in  [] LE / Lumbar: LE, proximal hip, and core control with self care, mobility, lifting, ambulation. [x] UE / Cervical: cervical, postural, scapular, scapulothoracic and UE control with self care, reaching, carrying, lifting, house/yardwork, driving, computer work. [x] (83168) Provided verbal/tactile cueing for activities related to improving balance, coordination, kinesthetic sense, posture, motor skill, proprioception to assist with   [] LE / lumbar: LE, proximal hip, and core control in self care, mobility, lifting, ambulation and eccentric single leg control. [x] UE / cervical: cervical, scapular, scapulothoracic and UE control with self care, reaching, carrying, lifting, house/yardwork, driving, computer work.   [] (47383) Therapist is in constant attendance of 2 or more patients providing skilled therapy interventions, but not providing any significant amount of measurable one-on-one time to either patient, for improvements in  [] LE / lumbar: LE, proximal hip, and core control in self care, mobility, lifting, ambulation and eccentric single leg control. [] UE / cervical: cervical, scapular, scapulothoracic and UE control with self care, reaching, carrying, lifting, house/yardwork, driving, computer work.      NMR and Therapeutic Activities:    [x] (03814 or 63685) Provided verbal/tactile cueing for activities related to improving balance, coordination, kinesthetic sense, posture, motor skill, proprioception and motor activation to allow for proper function of   [] LE: / Lumbar core, proximal hip and LE with self care and ADLs  [x] UE / Cervical: cervical, postural, scapular, scapulothoracic and UE control with self care, carrying, lifting, driving, computer work.   [] (14323) Gait Re-education- Provided training and instruction to the patient for proper LE, core and proximal hip recruitment and positioning and eccentric body weight control with ambulation re-education including up and down stairs     Home Management Training / Self Care:  [] (22460) Provided self-care/home management training related to activities of daily living and compensatory training, and/or use of adaptive equipment for improvement with: ADLs and compensatory training, meal preparation, safety procedures and instruction in use of adaptive equipment, including bathing, grooming, dressing, personal hygiene, basic household cleaning and chores.      Home Exercise Program:    [] (64040) Reviewed/Progressed HEP activities related to strengthening, flexibility, endurance, ROM of   [] LE / Lumbar: core, proximal hip and LE for functional self-care, mobility, lifting and ambulation/stair navigation   [] UE / Cervical: cervical, postural, scapular, scapulothoracic and UE control with self care, reaching, carrying, lifting, house/yardwork, driving, computer work  [] (65747)Reviewed/Progressed HEP activities related to improving balance, coordination, kinesthetic sense, posture, motor skill, proprioception of   [] LE: core, proximal hip and LE for self care, mobility, lifting, and ambulation/stair navigation    [] UE / Cervical: cervical, postural,  scapular, scapulothoracic and UE control with self care, reaching, carrying, lifting, house/yardwork, driving, computer work    Manual Treatments:  PROM / STM / Oscillations-Mobs:  G-I, II, III, IV (PA's, Inf., Post.)  [x] (20204) Provided manual therapy to mobilize LE, challenges even with reduce medication in her system. Patient to be reassessed next visit . Treatment/Activity Tolerance:  [x] Patient able to complete tx  [] Patient limited by fatigue  [] Patient limited by pain  [] Patient limited by other medical complications  [] Other:     Prognosis: [x] Good [] Fair  [] Poor    Patient Requires Follow-up: [x] Yes  [] No    Plan for next treatment session: habituation, head movement, manual therapy, cervical ROM & proprioception. Dynamic balance with gaze stabilization. Return to classes and postural awareness. PLAN: PT 2x / week for 6 weeks. Request 2x/week for 6 more weeks to meet goals  [x] Continue per plan of care [] Alter current plan (see comments)  [] Plan of care initiated [] Hold pending MD visit [] Discharge    Electronically signed by: Corwin Jimenez PT, DPT [id number]      Note: If patient does not return for scheduled/ recommended follow up visits, his note will serve as a discharge from care along with most recent update on progress.

## 2020-02-13 ENCOUNTER — HOSPITAL ENCOUNTER (OUTPATIENT)
Dept: PHYSICAL THERAPY | Age: 45
Setting detail: THERAPIES SERIES
Discharge: HOME | End: 2020-02-13
Payer: COMMERCIAL

## 2020-02-13 PROCEDURE — 97110 THERAPEUTIC EXERCISES: CPT

## 2020-02-13 PROCEDURE — 97140 MANUAL THERAPY 1/> REGIONS: CPT

## 2020-02-13 PROCEDURE — 97112 NEUROMUSCULAR REEDUCATION: CPT

## 2020-02-13 NOTE — FLOWSHEET NOTE
[facility] - Outpatient Physical Therapy              Physical Therapy Daily Treatment and Progress Note    Date:  2020    Patient Name:  Derrick Green    :  1975  MRN: [id number]  Medical/Treatment Diagnosis Information:  · Diagnosis: Cervical Craniosyndrome   · Treatment Diagnosis: Cervicalgia, Post-traumatic headache   Insurance/Certification information:  PT Insurance Information: [address]   Physician Information:  Referring Practitioner: Maru Esteban MD   Plan of care signed (Y/N): [x]  Yes []  No refaxed 19, refaxed , ,  faxed to number pt brought in see below, POC signed 19, PN signed   PT printed POC and PN for pt to take to MD appt for signature   pt brought contact info for MD phone [phone], fax [phone], Dept of Neuro, [address] [place] [address] [place], [address] [place] [address]     Date of Patient follow up with Physician:  19     Progress Report: []  Yes  [x]  No     Date Range for reporting period:   Beginning 10/2/19  Ending 19    Progress report due (10 Rx/or 30 days whichever is less): 24    Recertification due (POC duration/ or 90 days whichever is less):    Visit # Insurance Allowable Auth required? Date Range    10/12 8/30 []  Yes  [x]  No NA     Latex Allergy:  [x]NO      []YES  Preferred Language for Healthcare:   [x]English       []other:    Functional Scale: NDI: 15% Date assessed:   DGI     Pain level:  1-3/10 neck pain pain    SUBJECTIVE:  Patient reports that she had a flare up since  dizziness was 6/10 and head pressure, today dizziness/head pressure/pain neck 3/10. Pt would like to come the rest of visits and then return to MD. Pt has only taken meds 2x this week, but prior to that hadn't taken for 2 weeks.     OBJECTIVE:  20 see PN info cervical AROM flex 50, ext 45, rot20, sb 54, palp mm min/mod increase tone and tender  2/ 4SST- 15 sec on first attempt, 12 sec on 2nd attempt  1/16: pt 7 min late  12/6: PN cervical AROM flex 50, ext 42 with head pressure, sb L 15 increased head pressure, R 20, rot L50, R 54 some imrpovement with rot L  Palp L scalene mod increase tone and tender L UT, suboccipital min, NDI improved   10/16: SP (normal), saccades (normal), VOR horizontal (norm), VOR vertical (diff/dizziness), VOR cancellation (dizziness w/ vert, not with horizontal) - noted reduced cervical movement as testing progressed    RESTRICTIONS/PRECAUTIONS:     Exercises/Interventions:     Therapeutic Exercises (61630)  Resistance / level Sets/sec Reps Notes   Cervical AROM: standing  Rotation R/L  Sidebend R/L  Flex/Ext     2x ea    Retro Scap Circles   10x    UBE  L2 2 fwd/bwd=4min  2/4   Cervical stabilization:  Nods/turns with pillow on wall behind pt  Cervical ext luli    10xall   Mid row  High row  LPD  Horizontal abduction standing    Blue T-band      lime  3x10      2x10 12/11 increased to Blue T-band. 1/14 adjusted sets and repetitions, 2/4 increased sets and added chin tuck.    W to Y- added chin tuck   Rhomboid lift off facing the wall   10x  10x                              Therapeutic Activities (89490)                                            Neuromuscular Re-ed (37364)        Supine Chin Tuck & Lift  - with BUE flexion    B lats stretch   10 x   5x  3 slow    Trunk twist on airex   360 turn L/R   260 turn L/R with cervical ext    [id number] L/R  1x  1xdizzydizzyAirex FT EC head nods and turns, then one foot on foam one on floor same  10x ea    VOR vertical and horizontal head turns on airex DO HARDER STANCE 60\" vertical  60\" horizontal1/21                                         Ambulation with VOR head turns, fwd and bckwd   horizontal and vertical-tandem    1/21- increased sets   [facility] :  Ambulation w/ head turns/pitches  on Ramped surfaces fwd/bwd     1 lap 360 turn L/R 50'x2   x2 laps2x ea progression per patient tolerance, in order to prevent re-injury. [] Progressing: [x] Met: [] Not Met: [] Adjusted  2. Patient will have a decrease in pain to facilitate improvement in movement, function, and ADLs as indicated by Functional Deficits. [] Progressing: [x] Met: [] Not Met: [] Adjusted     Long Term Goals: To be achieved in: 6 weeks / DC  1. Disability index score of 42% or less for the NDI to assist with reaching prior level of function. [] Progressing: [x] Met: [] Not Met: [] Adjusted  2. Patient will demonstrate increased AROM to [facility] of cervical  spine and good LS mobility,  patients Functional Deficits. [x] Progressing: [] Met: [] Not Met: [] Adjusted  3. Patient will demonstrate an increase in postural awareness and control and activation of deep cervical stabilizers to allow for proper functional mobility as indicated by patients Functional Deficits. [] Progressing: [x] Met: [] Not Met: [] Adjusted  4. Patient will return to functional activities including exercising without increased symptoms or restriction. [x] Progressing: [] Met: [] Not Met: [] Adjusted  5. Patient to report 0 dizziness and moderate to severe pressure in her head with lying down and rapid head movement. [x] Progressing: [] Met: [] Not Met: [] Adjusted             Overall Progression Towards Functional goals/ Treatment Progress Update:  [x] Patient is progressing as expected towards functional goals listed. [] Progression is slowed due to complexities/Impairments listed. [] Progression has been slowed due to co-morbidities.   [] Plan just implemented, too soon to assess goals progression <30days   [] Goals require adjustment due to lack of progress  [] Patient is not progressing as expected and requires additional follow up with physician  [] Other     Persisting Functional Limitations/Impairments:  [x]Sleeping []Sitting               []Standing []Transfers        []Walking []Kneeling               []Stairs []Squatting / bending   []ADLs []Reaching  []Lifting  []Housework  []Driving []Job related tasks  []Sports/Recreation [x]Other: position changes, head movements      ASSESSMENT:  Patient able to tolerate advance balance and habituation exercises with minimal dizziness and head pressure. Dizziness is noted with uneven surfaces with head turns and gait activities. She has manage to respond to all the increase Vestibular challenges even with reduce medication in her system. Patient to be reassessed next visit . Treatment/Activity Tolerance:  [x] Patient able to complete tx  [] Patient limited by fatigue  [] Patient limited by pain  [] Patient limited by other medical complications  [] Other:     Prognosis: [x] Good [] Fair  [] Poor    Patient Requires Follow-up: [x] Yes  [] No    Plan for next treatment session: habituation, head movement, manual therapy, cervical ROM & proprioception. Dynamic balance with gaze stabilization. Return to classes and postural awareness. PLAN: PT 2x / week for 6 weeks. Request 2x/week for 6 more weeks to meet goals  [x] Continue per plan of care [] Alter current plan (see comments)  [] Plan of care initiated [] Hold pending MD visit [] Discharge    Electronically signed by: Miki Ugarte PT, DPT [id number]      Note: If patient does not return for scheduled/ recommended follow up visits, his note will serve as a discharge from care along with most recent update on progress.

## 2020-02-19 ENCOUNTER — HOSPITAL ENCOUNTER (OUTPATIENT)
Dept: PHYSICAL THERAPY | Age: 45
Setting detail: THERAPIES SERIES
Discharge: HOME | End: 2020-02-19
Payer: COMMERCIAL

## 2020-02-19 PROCEDURE — 97110 THERAPEUTIC EXERCISES: CPT

## 2020-02-19 PROCEDURE — 97140 MANUAL THERAPY 1/> REGIONS: CPT

## 2020-02-19 PROCEDURE — 97112 NEUROMUSCULAR REEDUCATION: CPT

## 2020-02-19 NOTE — FLOWSHEET NOTE
[facility] - Outpatient Physical Therapy              Physical Therapy Daily Treatment and Progress Note    Date:  2020    Patient Name:  Tori Russ    :  1975  MRN: [id number]  Medical/Treatment Diagnosis Information:  · Diagnosis: Cervical Craniosyndrome   · Treatment Diagnosis: Cervicalgia, Post-traumatic headache   Insurance/Certification information:  PT Insurance Information: [address]   Physician Information:  Referring Practitioner: Cricket Hoover MD   Plan of care signed (Y/N): [x]  Yes []  No refaxed 19, refaxed , ,  faxed to number pt brought in see below, POC signed 19, PN signed   PT printed POC and PN for pt to take to MD appt for signature   pt brought contact info for MD phone [phone], fax [phone], Dept of Neuro, [address] [place] [address] [place] apple, [address] [place] [address]     Date of Patient follow up with Physician:  19     Progress Report: []  Yes  [x]  No     Date Range for reporting period:   Beginning 10/2/19  Ending 19    Progress report due (10 Rx/or 30 days whichever is less): 24    Recertification due (POC duration/ or 90 days whichever is less):    Visit # Insurance Allowable Auth required? Date Range     []  Yes  [x]  No NA     Latex Allergy:  [x]NO      []YES  Preferred Language for Healthcare:   [x]English       []other:    Functional Scale: NDI: 15% Date assessed:   DGI     Pain level:  1-2/10 neck pain     SUBJECTIVE:  Patient reports feeling better since , pt took meds ,  and . Today dizziness/head pressure less than last week 0-3/10.      OBJECTIVE:  20 see PN info cervical AROM flex 50, ext 45, rot20, sb 54, palp mm min/mod increase tone and tender  / 4SST- 15 sec on first attempt, 12 sec on 2nd attempt  : pt 7 min late  : PN cervical AROM flex 50, ext 42 with head pressure, sb L 15 increased head pressure, R 20, rot L50, R 54 some imrpovement with rot L  Palp L scalene mod increase tone and tender L UT, suboccipital min, NDI improved   10/16: SP (normal), saccades (normal), VOR horizontal (norm), VOR vertical (diff/dizziness), VOR cancellation (dizziness w/ vert, not with horizontal) - noted reduced cervical movement as testing progressed    RESTRICTIONS/PRECAUTIONS:     Exercises/Interventions:     Therapeutic Exercises (32645)  Resistance / level Sets/sec Reps Notes   Cervical AROM: standing  Rotation R/L  Sidebend R/L  Flex/Ext     2x ea    Retro Scap Circles   10x    UBE  L2 2 fwd/bwd=4min  2/4   Cervical stabilization:  Nods/turns with pillow on wall behind pt  Cervical ext luli    10xall   Mid row  High row  LPD  Horizontal abduction standing    Blue T-band      blue  3x10      2x10 12/11 increased to Blue T-band. 1/14 adjusted sets and repetitions, 2/4 increased sets and added chin tuck.    W to Y- added chin tuck   Rhomboid lift off facing the wall   10x  10x                              Therapeutic Activities (83302)                                            Neuromuscular Re-ed (52036)        Supine Chin Tuck & Lift  - with BUE flexion    B lats stretch   10 x   5x  3 slow    Trunk twist on airex feet together  360 turn L/R   360 turn L/R with cervical ext    [id number] L/R  1x  1xdizzydizzyAirex FT EC head nods and turns, then one foot on foam one on floor same  10x ea    VOR vertical and horizontal head turns on airex partial tandem 60\" vertical  60\" horizontal1/21                                         Ambulation with VOR head turns, fwd and bckwd   horizontal and vertical-tandem    1/21- increased sets   [facility] :  Ambulation w/ head turns/pitches  on Ramped surfaces fwd/bwd     1 lap 360 turn L/R 50'x2   x2 laps2x ea                                                                        Manual Intervention (88773)        Supine with lg roll under knees gentle STM scalenes, UT, suboccipital, gentle manual ervical txn, manual postural,  scapular, scapulothoracic and UE control with self care, reaching, carrying, lifting, house/yardwork, driving, computer work    Manual Treatments:  PROM / STM / Oscillations-Mobs:  G-I, II, III, IV (PA's, Inf., Post.)  [x] (12039) Provided manual therapy to mobilize LE, proximal hip and/or LS spine soft tissue/joints for the purpose of modulating pain, promoting relaxation,  increasing ROM, reducing/eliminating soft tissue swelling/inflammation/restriction, improving soft tissue extensibility and allowing for proper ROM for normal function with   [] LE / lumbar: self care, mobility, lifting and ambulation. [x] UE / Cervical: self care, reaching, carrying, lifting, house/yardwork, driving, computer work. Modalities:  [] (29708) Vasopneumatic compression: Utilized vasopneumatic compression to decrease edema / swelling for the purpose of improving mobility and quad tone / recruitment which will allow for increased overall function including but not limited to self-care, transfers, ambulation, and ascending / descending stairs. Modalities:    12/6,  12/2: supine with lg roll under knees IF estim and CP L neck/hua with warm blanket 1/2 way thru checked on pt and turned up estim to tolerance  11/6: offered MHP, pt declined   10/16 - supine cervical MHP, legs up on bolster- 10'    Charges:  Timed Code Treatment Minutes: 58   Total Treatment Minutes: 58     [] EVAL - LOW (62602)   [] EVAL - MOD (06565)  [] EVAL - HIGH (04924)  [] RE-EVAL (78642)  [x] TE (85253) x 1     [] Ionto  [x] NMR (59210) x 2     [] Vaso  [x] Manual (39181) x  1     [] Ultrasound  [] TA x       [] Mech Traction (30880)  [] Gait Training x     [] ES (un) (54202):  [] Aquatic therapy x   [] Other:   [] Group:     GOALS:   Short Term Goals: To be achieved in: 2 weeks  1. Independent in HEP and progression per patient tolerance, in order to prevent re-injury. [] Progressing: [x] Met: [] Not Met: [] Adjusted  2.  Patient will have a

## 2020-02-21 ENCOUNTER — APPOINTMENT (OUTPATIENT)
Dept: PHYSICAL THERAPY | Age: 45
End: 2020-02-21
Payer: COMMERCIAL

## 2020-02-25 ENCOUNTER — HOSPITAL ENCOUNTER (OUTPATIENT)
Dept: PHYSICAL THERAPY | Age: 45
Setting detail: THERAPIES SERIES
Discharge: HOME | End: 2020-02-25
Payer: COMMERCIAL

## 2020-02-25 PROCEDURE — 97112 NEUROMUSCULAR REEDUCATION: CPT

## 2020-02-25 PROCEDURE — 97140 MANUAL THERAPY 1/> REGIONS: CPT

## 2020-02-25 PROCEDURE — 97110 THERAPEUTIC EXERCISES: CPT

## 2020-02-25 NOTE — FLOWSHEET NOTE
[facility] - Outpatient Physical Therapy              Physical Therapy Daily Treatment and Discharge Note    Date:  2020    Patient Name:  Jam Marinelli    :  1975  MRN: [id number]  Medical/Treatment Diagnosis Information:  · Diagnosis: Cervical Craniosyndrome   · Treatment Diagnosis: Cervicalgia, Post-traumatic headache   Insurance/Certification information:  PT Insurance Information: [address]   Physician Information:  Referring Practitioner: Yonatan Reyna MD   Plan of care signed (Y/N): [x]  Yes []  No refaxed 19, refaxed , ,  faxed to number pt brought in see below, POC signed 19, PN signed   PT printed POC and PN for pt to take to MD appt for signature   pt brought contact info for MD phone [phone], fax [phone], Dept of Neuro, [address] [place] [address] [place], [address] [place] [address]     Date of Patient follow up with Physician:  19     Progress Report: [x]  Yes  []  No     Date Range for reporting period:   Beginning 10/2/19  Ending 19    Progress report due (10 Rx/or 30 days whichever is less): 24    Recertification due (POC duration/ or 90 days whichever is less):    Visit # Insurance Allowable Auth required? Date Range     []  Yes  [x]  No NA     Latex Allergy:  [x]NO      []YES  Preferred Language for Healthcare:   [x]English       []other:    Functional Scale: NDI: 26% Date assessed:   DGI     Pain level:  0-2/10 neck pain     SUBJECTIVE:  Patient reports feeling better since , pt taking meds prn intermittently. Today dizziness/head pressure 0-3/10.      OBJECTIVE:    : D/C cervical AROM WFL but increase head pressure, soreness, dizziness  20 see PN info cervical AROM flex 50, ext 45, rot20, sb 54, palp mm min/mod increase tone and tender   4SST- 15 sec on first attempt, 12 sec on 2nd attempt  : pt 7 min late  : PN cervical AROM flex 50, ext 42 with head single leg control. [] UE / cervical: cervical, scapular, scapulothoracic and UE control with self care, reaching, carrying, lifting, house/yardwork, driving, computer work. NMR and Therapeutic Activities:    [x] (31201 or 70536) Provided verbal/tactile cueing for activities related to improving balance, coordination, kinesthetic sense, posture, motor skill, proprioception and motor activation to allow for proper function of   [] LE: / Lumbar core, proximal hip and LE with self care and ADLs  [x] UE / Cervical: cervical, postural, scapular, scapulothoracic and UE control with self care, carrying, lifting, driving, computer work.   [] (51887) Gait Re-education- Provided training and instruction to the patient for proper LE, core and proximal hip recruitment and positioning and eccentric body weight control with ambulation re-education including up and down stairs     Home Management Training / Self Care:  [] (07691) Provided self-care/home management training related to activities of daily living and compensatory training, and/or use of adaptive equipment for improvement with: ADLs and compensatory training, meal preparation, safety procedures and instruction in use of adaptive equipment, including bathing, grooming, dressing, personal hygiene, basic household cleaning and chores.      Home Exercise Program:    [] (63191) Reviewed/Progressed HEP activities related to strengthening, flexibility, endurance, ROM of   [] LE / Lumbar: core, proximal hip and LE for functional self-care, mobility, lifting and ambulation/stair navigation   [] UE / Cervical: cervical, postural, scapular, scapulothoracic and UE control with self care, reaching, carrying, lifting, house/yardwork, driving, computer work  [] (29930)Reviewed/Progressed HEP activities related to improving balance, coordination, kinesthetic sense, posture, motor skill, proprioception of   [] LE: core, proximal hip and LE for self care, mobility, lifting, and ambulation/stair navigation    [] UE / Cervical: cervical, postural,  scapular, scapulothoracic and UE control with self care, reaching, carrying, lifting, house/yardwork, driving, computer work    Manual Treatments:  PROM / STM / Oscillations-Mobs:  G-I, II, III, IV (PA's, Inf., Post.)  [x] (78583) Provided manual therapy to mobilize LE, proximal hip and/or LS spine soft tissue/joints for the purpose of modulating pain, promoting relaxation,  increasing ROM, reducing/eliminating soft tissue swelling/inflammation/restriction, improving soft tissue extensibility and allowing for proper ROM for normal function with   [] LE / lumbar: self care, mobility, lifting and ambulation. [x] UE / Cervical: self care, reaching, carrying, lifting, house/yardwork, driving, computer work. Modalities:  [] (27320) Vasopneumatic compression: Utilized vasopneumatic compression to decrease edema / swelling for the purpose of improving mobility and quad tone / recruitment which will allow for increased overall function including but not limited to self-care, transfers, ambulation, and ascending / descending stairs. Modalities:    12/6,  12/2: supine with lg roll under knees IF estim and CP L neck/hua with warm blanket 1/2 way thru checked on pt and turned up estim to tolerance  11/6: offered MHP, pt declined   10/16 - supine cervical MHP, legs up on bolster- 10'    Charges:  Timed Code Treatment Minutes: 58   Total Treatment Minutes: 58     [] EVAL - LOW (44945)   [] EVAL - MOD (83261)  [] EVAL - HIGH (98336)  [] RE-EVAL (26121)  [x] TE (57633) x 1     [] Ionto  [x] NMR (09184) x 2     [] Vaso  [x] Manual (10331) x  1     [] Ultrasound  [] TA x       [] Mech Traction (67513)  [] Gait Training x     [] ES (un) (74668):  [] Aquatic therapy x   [] Other:   [] Group:     GOALS:   Short Term Goals: To be achieved in: 2 weeks  1. Independent in HEP and progression per patient tolerance, in order to prevent re-injury.    [] Progressing: [x] Met: [] Not Met: [] Adjusted  2. Patient will have a decrease in pain to facilitate improvement in movement, function, and ADLs as indicated by Functional Deficits. [] Progressing: [x] Met: [] Not Met: [] Adjusted     Long Term Goals: To be achieved in: 6 weeks / DC  1. Disability index score of 42% or less for the NDI to assist with reaching prior level of function. [] Progressing: [x] Met: [] Not Met: [] Adjusted  2. Patient will demonstrate increased AROM to [facility] of cervical  spine and good LS mobility,  patients Functional Deficits. [x] Progressing: [] Met: [] Not Met: [] Adjusted  3. Patient will demonstrate an increase in postural awareness and control and activation of deep cervical stabilizers to allow for proper functional mobility as indicated by patients Functional Deficits. [] Progressing: [x] Met: [] Not Met: [] Adjusted  4. Patient will return to functional activities including exercising without increased symptoms or restriction. [x] Progressing: [] Met: [] Not Met: [] Adjusted  5. Patient to report 0 dizziness and moderate to severe pressure in her head with lying down and rapid head movement. [x] Progressing: [] Met: [] Not Met: [] Adjusted             Overall Progression Towards Functional goals/ Treatment Progress Update:  [x] Patient is progressing as expected towards functional goals listed. [] Progression is slowed due to complexities/Impairments listed. [] Progression has been slowed due to co-morbidities.   [] Plan just implemented, too soon to assess goals progression <30days   [] Goals require adjustment due to lack of progress  [] Patient is not progressing as expected and requires additional follow up with physician  [] Other     Persisting Functional Limitations/Impairments:  [x]Sleeping []Sitting               []Standing []Transfers        []Walking []Kneeling               []Stairs []Squatting / bending   []ADLs []Reaching  []Lifting

## 2021-03-06 ENCOUNTER — HOSPITAL ENCOUNTER (OUTPATIENT)
Dept: CT IMAGING | Age: 46
Discharge: HOME | End: 2021-03-06
Payer: COMMERCIAL

## 2021-03-06 DIAGNOSIS — R31.29 HEMATURIA, MICROSCOPIC: ICD-10-CM

## 2021-03-06 DIAGNOSIS — R35.0 FREQUENCY OF MICTURITION: ICD-10-CM

## 2021-03-06 PROCEDURE — 74178 CT ABD&PLV WO CNTR FLWD CNTR: CPT

## 2021-03-06 PROCEDURE — 6360000004 HC RX CONTRAST MEDICATION: Performed by: UROLOGY

## 2021-03-06 RX ADMIN — IOPAMIDOL 120 ML: 755 INJECTION, SOLUTION INTRAVENOUS at 13:06

## 2021-10-20 ENCOUNTER — TELEPHONE (OUTPATIENT)
Dept: FAMILY MEDICINE CLINIC | Age: 46
End: 2021-10-20

## 2021-10-20 NOTE — TELEPHONE ENCOUNTER
----- Message from Alethea Harrell sent at 10/18/2021  9:37 AM EDT -----  Subject: Message to Provider    QUESTIONS  Information for Provider? PT needs to get in sooner than Dec 1 if   possible, would like to establish care but pt is having issues with   constantly having bacteria and blood in urine, pt wants to get in to see   what is going on with a different doctor, screened green. ---------------------------------------------------------------------------  --------------  Awa POLLACK  What is the best way for the office to contact you? OK to leave message on   voicemail  Preferred Call Back Phone Number? [phone]  ---------------------------------------------------------------------------  --------------  SCRIPT ANSWERS  Relationship to Patient?  Self

## 2021-11-03 ENCOUNTER — OFFICE VISIT (OUTPATIENT)
Dept: FAMILY MEDICINE CLINIC | Age: 46
End: 2021-11-03
Payer: COMMERCIAL

## 2021-11-03 VITALS
BODY MASS INDEX: 29.06 KG/M2 | DIASTOLIC BLOOD PRESSURE: 78 MMHG | HEART RATE: 95 BPM | SYSTOLIC BLOOD PRESSURE: 120 MMHG | OXYGEN SATURATION: 99 % | WEIGHT: 164 LBS | HEIGHT: 63 IN

## 2021-11-03 DIAGNOSIS — R82.71 BACTERIURIA: ICD-10-CM

## 2021-11-03 DIAGNOSIS — G93.2 PSEUDOTUMOR CEREBRI: ICD-10-CM

## 2021-11-03 DIAGNOSIS — Z00.00 ANNUAL PHYSICAL EXAM: ICD-10-CM

## 2021-11-03 DIAGNOSIS — R06.02 SHORTNESS OF BREATH: ICD-10-CM

## 2021-11-03 DIAGNOSIS — R39.9 UTI SYMPTOMS: Primary | ICD-10-CM

## 2021-11-03 DIAGNOSIS — R51.9 NONINTRACTABLE HEADACHE, UNSPECIFIED CHRONICITY PATTERN, UNSPECIFIED HEADACHE TYPE: ICD-10-CM

## 2021-11-03 LAB
BILIRUBIN, POC: NORMAL
BLOOD URINE, POC: NORMAL
CLARITY, POC: CLEAR
COLOR, POC: YELLOW
GLUCOSE URINE, POC: NORMAL
KETONES, POC: NORMAL
LEUKOCYTE EST, POC: NORMAL
NITRITE, POC: NORMAL
PH, POC: 6
PROTEIN, POC: NORMAL
SPECIFIC GRAVITY, POC: 1
UROBILINOGEN, POC: 0.2

## 2021-11-03 PROCEDURE — 99386 PREV VISIT NEW AGE 40-64: CPT | Performed by: FAMILY MEDICINE

## 2021-11-03 PROCEDURE — 81002 URINALYSIS NONAUTO W/O SCOPE: CPT | Performed by: FAMILY MEDICINE

## 2021-11-03 PROCEDURE — G8484 FLU IMMUNIZE NO ADMIN: HCPCS | Performed by: FAMILY MEDICINE

## 2021-11-03 RX ORDER — NITROFURANTOIN MACROCRYSTALS 50 MG/1
50 CAPSULE ORAL DAILY
Qty: 30 CAPSULE | Refills: 2 | Status: SHIPPED | OUTPATIENT
Start: 2021-11-03 | End: 2021-11-13

## 2021-11-03 RX ORDER — ALBUTEROL SULFATE 90 UG/1
2 AEROSOL, METERED RESPIRATORY (INHALATION) EVERY 6 HOURS PRN
Qty: 18 G | Refills: 3 | Status: SHIPPED | OUTPATIENT
Start: 2021-11-03

## 2021-11-03 SDOH — ECONOMIC STABILITY: FOOD INSECURITY: WITHIN THE PAST 12 MONTHS, THE FOOD YOU BOUGHT JUST DIDN'T LAST AND YOU DIDN'T HAVE MONEY TO GET MORE.: NEVER TRUE

## 2021-11-03 SDOH — ECONOMIC STABILITY: FOOD INSECURITY: WITHIN THE PAST 12 MONTHS, YOU WORRIED THAT YOUR FOOD WOULD RUN OUT BEFORE YOU GOT MONEY TO BUY MORE.: NEVER TRUE

## 2021-11-03 ASSESSMENT — PATIENT HEALTH QUESTIONNAIRE - PHQ9
2. FEELING DOWN, DEPRESSED OR HOPELESS: 0
SUM OF ALL RESPONSES TO PHQ9 QUESTIONS 1 & 2: 0
SUM OF ALL RESPONSES TO PHQ QUESTIONS 1-9: 0
1. LITTLE INTEREST OR PLEASURE IN DOING THINGS: 0

## 2021-11-03 ASSESSMENT — SOCIAL DETERMINANTS OF HEALTH (SDOH): HOW HARD IS IT FOR YOU TO PAY FOR THE VERY BASICS LIKE FOOD, HOUSING, MEDICAL CARE, AND HEATING?: NOT HARD AT ALL

## 2021-11-03 NOTE — PROGRESS NOTES
[address] Note    Date: 11/3/2021                                               Bienvenido Situ:     Chief Complaint   Patient presents with    New Patient    Urinary Tract Infection     bacterial and blood in urine finshiesh antibiotics       HPI   Is here for annual exam.  Last Tdap 2019. Has never had a colonoscopy. Currently takes no medicines. Pt has hx of pseudotumor cerebri that causes intermittent HA and fatigue. Has been a problem for about a year. At some point it was noted that she had blood and bacteria in her urine when her symptoms flare and she gets treated with antibiotics by her urologist and the symptoms resolve. Symptoms tend to return when the antibiotics are finished. Has worked well with macrobid, but after several rounds of antibiotics they don't seem to help as well. Sees neurology, has been prescribed Acetazolamide, which helps when she takes it, but causes side effects. Has had MRI of brain along with EKG, CT of abdomen and pelvis , MRI venogram of the brain, renal US, echocardiogram.        There are no problems to display for this patient. History reviewed. No pertinent past medical history. Current Outpatient Medications   Medication Sig Dispense Refill    nitrofurantoin (MACRODANTIN) 50 MG capsule Take 1 capsule by mouth daily for 10 days 30 capsule 2    albuterol sulfate HFA (PROVENTIL HFA) 108 (90 Base) MCG/ACT inhaler Inhale 2 puffs into the lungs every 6 hours as needed for Wheezing 18 g 3     No current facility-administered medications for this visit.        No Known Allergies    Review of Systems   No CP, no SOB, no rash, no bruise, no HA, no vision change, no ankle swelling, no hearing problems, no LAD      Vitals:  /78   Pulse 95   Ht 5' 3.39\" (1.61 m)   Wt 164 lb (74.4 kg)   SpO2 99%   BMI 28.70 kg/m²     Wt Readings from Last 3 Encounters:   11/03/21 164 lb (74.4 kg)        Physical Exam   General:  Well-appearing, NAD, alert, non-toxic  HEENT:  Normocephalic, atraumatic. Pupils equal and round. TMs pearly with good landmarks. Moist mucous membranes. Normal dentition  NECK:  Supple, normal range of motion, no LAD, no meningeal signs, no JVD, nontender  CHEST/LUNGS: CTAB, no crackles, no wheeze, no rhonchi. Symmetric rise  CARDIOVASCULAR: RRR,  no murmur, no rub  ABDOMEN: Soft, non-tender, non-distended. No masses  EXTREMETIES: Normal movement of all extremities. No edema. No joint swelling. SKIN:  No rash, no cellulitis, no bruising, no petechiae/purpura/vesicles/pustules/abscess  PSYCH:  A+O x 3; normal affect  NEURO:  GCS 15, CN2-12 grossly intact, no focal motor/sensory deficits, no cerebellar deficits, normal gait, normal speech      Assessment/Plan     70-year-old female here for annual exam.  Patient declines flu shot. Check routine labs. Vital signs are stable. Patient has no cervix. Tdap up-to-date. Patient has a convoluted history of pseudotumor cerebri causing headaches that seem to improve with treatment of asymptomatic bacteriuria. Given her positive results, we will treat her on daily low-dose of Macrobid. Follow-up for reassessment in 1 month. Do recommend following up with neurology for pseudotumor cerebri. Imaging shows no sign of neoplasm or bladder or kidney anomaly. Discussed with patient medication/s dosage, instructions, potential S/E, A/R and Drug Interaction  Tylenol or Motrin as needed for pain or fever  Advise to return here if worse or go to nearest ER  Encourage fluids  Pt discharged in stable condition at 14:56      1. UTI symptoms  -     POCT Urinalysis no Micro  -     Culture, Urine  2. Annual physical exam  -     CBC Auto Differential; Future  -     Comprehensive Metabolic Panel; Future  -     Hemoglobin A1C; Future  -     Hepatitis C Antibody; Future  -     HIV Screen; Future  -     Lipid, Fasting; Future  -     TSH with Reflex; Future  3.  Shortness of breath  -     albuterol sulfate HFA (PROVENTIL HFA) 108 (90 Base) MCG/ACT inhaler; Inhale 2 puffs into the lungs every 6 hours as needed for Wheezing, Disp-18 g, R-3Normal  4. Bacteriuria  -     nitrofurantoin (MACRODANTIN) 50 MG capsule; Take 1 capsule by mouth daily for 10 days, Disp-30 capsule, R-2Normal  5. Pseudotumor cerebri  6. Nonintractable headache, unspecified chronicity pattern, unspecified headache type  -     nitrofurantoin (MACRODANTIN) 50 MG capsule; Take 1 capsule by mouth daily for 10 days, Disp-30 capsule, R-2Normal       Orders Placed This Encounter   Procedures    Culture, Urine     Order Specific Question:   Specify (ex-cath, midstream, cysto, etc)? Answer:   midstream    CBC Auto Differential     Standing Status:   Future     Standing Expiration Date:   11/3/2022    Comprehensive Metabolic Panel     Standing Status:   Future     Standing Expiration Date:   11/3/2022    Hemoglobin A1C     Standing Status:   Future     Standing Expiration Date:   11/3/2022    Hepatitis C Antibody     Standing Status:   Future     Standing Expiration Date:   11/3/2022    HIV Screen     Standing Status:   Future     Standing Expiration Date:   11/3/2022    Lipid, Fasting     Standing Status:   Future     Standing Expiration Date:   11/3/2022    TSH with Reflex     Standing Status:   Future     Standing Expiration Date:   11/3/2022    POCT Urinalysis no Micro       No follow-ups on file.     Champ Adame MD, MD    11/3/2021  2:47 PM

## 2021-11-04 LAB — URINE CULTURE, ROUTINE: NORMAL

## 2021-12-01 ENCOUNTER — OFFICE VISIT (OUTPATIENT)
Dept: FAMILY MEDICINE CLINIC | Age: 46
End: 2021-12-01
Payer: COMMERCIAL

## 2021-12-01 VITALS
WEIGHT: 165 LBS | HEART RATE: 108 BPM | SYSTOLIC BLOOD PRESSURE: 130 MMHG | BODY MASS INDEX: 28.87 KG/M2 | DIASTOLIC BLOOD PRESSURE: 80 MMHG | OXYGEN SATURATION: 100 %

## 2021-12-01 DIAGNOSIS — G93.2 PSEUDOTUMOR CEREBRI: ICD-10-CM

## 2021-12-01 DIAGNOSIS — Z79.890 HORMONE REPLACEMENT THERAPY: Primary | ICD-10-CM

## 2021-12-01 PROCEDURE — G8427 DOCREV CUR MEDS BY ELIG CLIN: HCPCS | Performed by: FAMILY MEDICINE

## 2021-12-01 PROCEDURE — 99213 OFFICE O/P EST LOW 20 MIN: CPT | Performed by: FAMILY MEDICINE

## 2021-12-01 PROCEDURE — 4004F PT TOBACCO SCREEN RCVD TLK: CPT | Performed by: FAMILY MEDICINE

## 2021-12-01 PROCEDURE — G8484 FLU IMMUNIZE NO ADMIN: HCPCS | Performed by: FAMILY MEDICINE

## 2021-12-01 PROCEDURE — G8419 CALC BMI OUT NRM PARAM NOF/U: HCPCS | Performed by: FAMILY MEDICINE

## 2021-12-01 RX ORDER — NITROFURANTOIN 25; 75 MG/1; MG/1
100 CAPSULE ORAL 2 TIMES DAILY
Qty: 30 CAPSULE | Refills: 5 | Status: SHIPPED | OUTPATIENT
Start: 2021-12-01 | End: 2022-10-24

## 2021-12-01 NOTE — PROGRESS NOTES
[address] Note    Date: 12/1/2021                                               [place]:     Chief Complaint   Patient presents with    Discuss Medications     hormone cream     Shortness of Breath       HPI   Pt here for refill of hormone replacement cream. Has been on it for about a year, is s/p hysterectomy. Works well at controlling her hot flashes. Patient has been taking 50 mg of Macrobid daily for her recurrent symptoms of pseudotumor cerebri. She thinks it works somewhat, however 100 mg dose is a bit more effective. She will be seeing her neurologist again next week. There are no problems to display for this patient. No past medical history on file. Current Outpatient Medications   Medication Sig Dispense Refill    nitrofurantoin, macrocrystal-monohydrate, (MACROBID) 100 MG capsule Take 1 capsule by mouth 2 times daily 30 capsule 5    albuterol sulfate HFA (PROVENTIL HFA) 108 (90 Base) MCG/ACT inhaler Inhale 2 puffs into the lungs every 6 hours as needed for Wheezing 18 g 3     No current facility-administered medications for this visit. No Known Allergies    Review of Systems   No fever, no cough, no vomiting    Vitals:  /80   Pulse 108   Wt 165 lb (74.8 kg)   SpO2 100%   BMI 28.87 kg/m²     Wt Readings from Last 3 Encounters:   12/01/21 165 lb (74.8 kg)   11/03/21 164 lb (74.4 kg)        Physical Exam   General:  Well-appearing, NAD, alert, non-toxic  HEENT:  Normocephalic, atraumatic. CHEST/LUNGS: No dyspnea  EXTREMETIES: Normal movement of all extremities. No edema. No joint swelling. SKIN:  No rash, no cellulitis, no bruising, no petechiae/purpura/vesicles/pustules/abscess  PSYCH:  A+O x 3; normal affect  NEURO:  GCS 15, CN2-12 grossly intact, no focal motor/sensory deficits, normal gait, normal speech      Assessment/Plan     70-year-old female with history of pseudotumor cerebri and associated symptoms.   Patient appears to feel better on a higher dose of Macrobid, 100 mg daily, so I will continue this regimen. I explained to the patient this is off label use, and though it is unclear how this may help her condition, that the side effects and potential harm from [address] are negligible. I recommend the patient follow-up with her neurologist for further evaluation treatment regarding this issue. Patient desires hormone replacement therapy. Recommend ongoing topical treatment, appears to be working well. Discharge in stable condition. 1. Hormone replacement therapy  2. Pseudotumor cerebri  -     nitrofurantoin, macrocrystal-monohydrate, (MACROBID) 100 MG capsule; Take 1 capsule by mouth 2 times daily, Disp-30 capsule, R-5Normal       No orders of the defined types were placed in this encounter. No follow-ups on file.     Deanna Sam MD, MD    12/1/2021  5:28 PM

## 2021-12-10 DIAGNOSIS — Z00.00 ANNUAL PHYSICAL EXAM: ICD-10-CM

## 2021-12-10 LAB
A/G RATIO: 1.8 (ref 1.1–2.2)
ALBUMIN SERPL-MCNC: 4.6 G/DL (ref 3.4–5)
ALP BLD-CCNC: 82 U/L (ref 40–129)
ALT SERPL-CCNC: 17 U/L (ref 10–40)
ANION GAP SERPL CALCULATED.3IONS-SCNC: 12 MMOL/L (ref 3–16)
AST SERPL-CCNC: 18 U/L (ref 15–37)
BASOPHILS ABSOLUTE: 0.1 K/UL (ref 0–0.2)
BASOPHILS RELATIVE PERCENT: 0.9 %
BILIRUB SERPL-MCNC: 0.3 MG/DL (ref 0–1)
BUN BLDV-MCNC: 13 MG/DL (ref 7–20)
CALCIUM SERPL-MCNC: 9.4 MG/DL (ref 8.3–10.6)
CHLORIDE BLD-SCNC: 101 MMOL/L (ref 99–110)
CHOLESTEROL, FASTING: 217 MG/DL (ref 0–199)
CO2: 24 MMOL/L (ref 21–32)
CREAT SERPL-MCNC: 0.7 MG/DL (ref 0.6–1.1)
EOSINOPHILS ABSOLUTE: 0.1 K/UL (ref 0–0.6)
EOSINOPHILS RELATIVE PERCENT: 1.4 %
GFR AFRICAN AMERICAN: >60
GFR NON-AFRICAN AMERICAN: >60
GLUCOSE BLD-MCNC: 86 MG/DL (ref 70–99)
HCT VFR BLD CALC: 40 % (ref 36–48)
HDLC SERPL-MCNC: 45 MG/DL (ref 40–60)
HEMOGLOBIN: 13.4 G/DL (ref 12–16)
HEPATITIS C ANTIBODY INTERPRETATION: NORMAL
LDL CHOLESTEROL CALCULATED: 114 MG/DL
LYMPHOCYTES ABSOLUTE: 2 K/UL (ref 1–5.1)
LYMPHOCYTES RELATIVE PERCENT: 31.4 %
MCH RBC QN AUTO: 33 PG (ref 26–34)
MCHC RBC AUTO-ENTMCNC: 33.4 G/DL (ref 31–36)
MCV RBC AUTO: 98.7 FL (ref 80–100)
MONOCYTES ABSOLUTE: 0.6 K/UL (ref 0–1.3)
MONOCYTES RELATIVE PERCENT: 9.3 %
NEUTROPHILS ABSOLUTE: 3.7 K/UL (ref 1.7–7.7)
NEUTROPHILS RELATIVE PERCENT: 57 %
PDW BLD-RTO: 13.4 % (ref 12.4–15.4)
PLATELET # BLD: 249 K/UL (ref 135–450)
PMV BLD AUTO: 9.7 FL (ref 5–10.5)
POTASSIUM SERPL-SCNC: 4.6 MMOL/L (ref 3.5–5.1)
RBC # BLD: 4.05 M/UL (ref 4–5.2)
SODIUM BLD-SCNC: 137 MMOL/L (ref 136–145)
TOTAL PROTEIN: 7.1 G/DL (ref 6.4–8.2)
TRIGLYCERIDE, FASTING: 289 MG/DL (ref 0–150)
TSH REFLEX: 2.46 UIU/ML (ref 0.27–4.2)
VLDLC SERPL CALC-MCNC: 58 MG/DL
WBC # BLD: 6.4 K/UL (ref 4–11)

## 2021-12-11 LAB
ESTIMATED AVERAGE GLUCOSE: 105.4 MG/DL
HBA1C MFR BLD: 5.3 %
HIV AG/AB: NORMAL
HIV ANTIGEN: NORMAL
HIV-1 ANTIBODY: NORMAL
HIV-2 AB: NORMAL

## 2022-02-14 ENCOUNTER — OFFICE VISIT (OUTPATIENT)
Dept: FAMILY MEDICINE CLINIC | Age: 47
End: 2022-02-14
Payer: COMMERCIAL

## 2022-02-14 VITALS
HEART RATE: 91 BPM | BODY MASS INDEX: 28.52 KG/M2 | SYSTOLIC BLOOD PRESSURE: 120 MMHG | DIASTOLIC BLOOD PRESSURE: 80 MMHG | OXYGEN SATURATION: 98 % | WEIGHT: 163 LBS

## 2022-02-14 DIAGNOSIS — R51.9 NONINTRACTABLE HEADACHE, UNSPECIFIED CHRONICITY PATTERN, UNSPECIFIED HEADACHE TYPE: ICD-10-CM

## 2022-02-14 DIAGNOSIS — Z23 NEED FOR VACCINATION: Primary | ICD-10-CM

## 2022-02-14 DIAGNOSIS — R39.9 UTI SYMPTOMS: ICD-10-CM

## 2022-02-14 DIAGNOSIS — G93.2 PSEUDOTUMOR CEREBRI: ICD-10-CM

## 2022-02-14 DIAGNOSIS — R07.81 RIB PAIN: ICD-10-CM

## 2022-02-14 LAB
BILIRUBIN, POC: NORMAL
BLOOD URINE, POC: NORMAL
CLARITY, POC: CLEAR
COLOR, POC: YELLOW
GLUCOSE URINE, POC: NORMAL
KETONES, POC: NORMAL
LEUKOCYTE EST, POC: NORMAL
NITRITE, POC: NORMAL
PH, POC: 5.5
PROTEIN, POC: NORMAL
SPECIFIC GRAVITY, POC: 1.01
UROBILINOGEN, POC: 0.2

## 2022-02-14 PROCEDURE — 81002 URINALYSIS NONAUTO W/O SCOPE: CPT | Performed by: FAMILY MEDICINE

## 2022-02-14 PROCEDURE — G8419 CALC BMI OUT NRM PARAM NOF/U: HCPCS | Performed by: FAMILY MEDICINE

## 2022-02-14 PROCEDURE — 99214 OFFICE O/P EST MOD 30 MIN: CPT | Performed by: FAMILY MEDICINE

## 2022-02-14 PROCEDURE — G8484 FLU IMMUNIZE NO ADMIN: HCPCS | Performed by: FAMILY MEDICINE

## 2022-02-14 PROCEDURE — G8427 DOCREV CUR MEDS BY ELIG CLIN: HCPCS | Performed by: FAMILY MEDICINE

## 2022-02-14 PROCEDURE — 4004F PT TOBACCO SCREEN RCVD TLK: CPT | Performed by: FAMILY MEDICINE

## 2022-02-14 RX ORDER — AMOXICILLIN 500 MG/1
500 CAPSULE ORAL 3 TIMES DAILY
Qty: 84 CAPSULE | Refills: 0 | Status: SHIPPED | OUTPATIENT
Start: 2022-02-14 | End: 2022-03-18 | Stop reason: SDUPTHER

## 2022-02-14 RX ORDER — NITROFURANTOIN 25; 75 MG/1; MG/1
CAPSULE ORAL
COMMUNITY
Start: 2022-02-04 | End: 2022-06-30

## 2022-02-14 RX ORDER — NITROFURANTOIN 25; 75 MG/1; MG/1
100 CAPSULE ORAL 3 TIMES DAILY
Qty: 90 CAPSULE | Refills: 1 | Status: SHIPPED | OUTPATIENT
Start: 2022-02-14 | End: 2022-03-16

## 2022-02-14 NOTE — PROGRESS NOTES
[address] Note    Date: 2/14/2022                                               Rico Fernandes:     Chief Complaint   Patient presents with    3 Month Follow-Up       HPI   Pt is here for f/u on her pseudotumor cerebri. Was taking 100mg of Macrobid until late December, then increased it to 200mg daily. A few weeks later started having worsening HA so she took Acetazolamide and increased the Macrobid to 300mg divided in 3 doses and it helps. Still has the pain/ HA but she is able to function. Other symptoms include pain in her BL rib cage and a sensation at night of pressure in her lungs that makes her breathing slightly labored. +BL hip pain at times. Patient also tried amoxicillin combination with Macrobid last year, and it seemed to help a lot. Will be seeing the neurologist for follow-up on pseudotumor cerebri read later this month. Tried acetazolamide per neurology's recommendation in the past, but it caused significant side effects. There are no problems to display for this patient. No past medical history on file. Current Outpatient Medications   Medication Sig Dispense Refill    nitrofurantoin, macrocrystal-monohydrate, (MACROBID) 100 MG capsule TAKE 1 CAPSULE BY MOUTH 2 TIMES DAILY      amoxicillin (AMOXIL) 500 MG capsule Take 1 capsule by mouth 3 times daily for 28 days 84 capsule 0    nitrofurantoin, macrocrystal-monohydrate, (MACROBID) 100 MG capsule Take 1 capsule by mouth 3 times daily 90 capsule 1    albuterol sulfate HFA (PROVENTIL HFA) 108 (90 Base) MCG/ACT inhaler Inhale 2 puffs into the lungs every 6 hours as needed for Wheezing 18 g 3     No current facility-administered medications for this visit.        No Known Allergies    Review of Systems    Vitals:  /80   Pulse 91   Wt 163 lb (73.9 kg)   SpO2 98%   BMI 28.52 kg/m²     Wt Readings from Last 3 Encounters:   02/14/22 163 lb (73.9 kg)   12/01/21 165 lb (74.8 kg)   11/03/21 164 lb (74.4 kg) Physical Exam   General:  Well-appearing, NAD, alert, non-toxic  HEENT:  Normocephalic, atraumatic. CHEST/LUNGS: No dyspnea  EXTREMETIES: Normal movement of all extremities. No edema. No joint swelling. SKIN:  No rash, no cellulitis, no bruising, no petechiae/purpura/vesicles/pustules/abscess  PSYCH:  A+O x 3; normal affect  NEURO:  GCS 15, CN2-12 grossly intact, no focal motor/sensory deficits, normal gait, normal speech      Assessment/Plan     45-year-old female with frequent headaches and rib pain and occasional labored breathing. Headaches could be due to pseudotumor cerebri, so recommend following up with neurology as scheduled. Given the abnormal fluctuation and constellation of symptoms, could also be due to tickborne infection. For this reason we will add amoxicillin to her regimen. Patient advised to follow-up in 1 month if symptoms have not resolved. Discussed with patient medication/s dosage, instructions, potential S/E, A/R and Drug Interaction  Tylenol or Motrin as needed for pain or fever  Advise to return here if worse or go to nearest ER  Encourage fluids  Pt discharged in stable condition at 13:45      1. Need for vaccination  -     POCT Urinalysis no Micro  2. UTI symptoms  -     Culture, Urine  3. Pseudotumor cerebri  -     amoxicillin (AMOXIL) 500 MG capsule; Take 1 capsule by mouth 3 times daily for 28 days, Disp-84 capsule, R-0Normal  -     nitrofurantoin, macrocrystal-monohydrate, (MACROBID) 100 MG capsule; Take 1 capsule by mouth 3 times daily, Disp-90 capsule, R-1Normal  4. Nonintractable headache, unspecified chronicity pattern, unspecified headache type  -     amoxicillin (AMOXIL) 500 MG capsule; Take 1 capsule by mouth 3 times daily for 28 days, Disp-84 capsule, R-0Normal  -     nitrofurantoin, macrocrystal-monohydrate, (MACROBID) 100 MG capsule; Take 1 capsule by mouth 3 times daily, Disp-90 capsule, R-1Normal  5. Rib pain  -     amoxicillin (AMOXIL) 500 MG capsule;  Take 1 capsule by mouth 3 times daily for 28 days, Disp-84 capsule, R-0Normal  -     nitrofurantoin, macrocrystal-monohydrate, (MACROBID) 100 MG capsule; Take 1 capsule by mouth 3 times daily, Disp-90 capsule, R-1Normal       Orders Placed This Encounter   Procedures    Culture, Urine     Order Specific Question:   Specify (ex-cath, midstream, cysto, etc)? Answer:   midstream    POCT Urinalysis no Micro       No follow-ups on file.     Mary Bowman MD, MD    2/14/2022  1:50 PM

## 2022-02-15 ENCOUNTER — TELEPHONE (OUTPATIENT)
Dept: FAMILY MEDICINE CLINIC | Age: 47
End: 2022-02-15

## 2022-02-15 LAB — URINE CULTURE, ROUTINE: NORMAL

## 2022-02-15 NOTE — TELEPHONE ENCOUNTER
Pharmacy is asking verification for Rx nitrofurantoin, macrocrystal-monohydrate, (MACROBID) 100 MG capsule    mg Route: Oral Frequency: 3 TIMES DAILY   Dispense Quantity: 90 capsule Refills: 1          Sig: Take 1 capsule by mouth 3 times daily     Pharmacy is asking if 3 times daily is correct. Usually it is 1-2 per day.

## 2022-02-15 NOTE — TELEPHONE ENCOUNTER
Please start PA for nitrofurantoin, macrocrystal-monohydrate, (MACROBID) 100 MG capsule take 3 times a day/

## 2022-02-15 NOTE — TELEPHONE ENCOUNTER
Spoke to pharmacist and she states that patient's insurance will not cover the Rx for 3 times per day.     She will send over a request for a PA

## 2022-02-17 ENCOUNTER — TELEPHONE (OUTPATIENT)
Dept: ADMINISTRATIVE | Age: 47
End: 2022-02-17

## 2022-02-17 NOTE — TELEPHONE ENCOUNTER
Submitted PA for Nitrofurantoin Macrocrystal 100MG capsules      Via [facility]: [id number]. Per plan \"No Authorization Required. No authorization is required for the medication requested. \"    Please advise patient. Thank you.

## 2022-04-12 DIAGNOSIS — G93.2 PSEUDOTUMOR CEREBRI: ICD-10-CM

## 2022-04-12 DIAGNOSIS — R07.81 RIB PAIN: ICD-10-CM

## 2022-04-12 DIAGNOSIS — R51.9 NONINTRACTABLE HEADACHE, UNSPECIFIED CHRONICITY PATTERN, UNSPECIFIED HEADACHE TYPE: ICD-10-CM

## 2022-04-13 RX ORDER — AMOXICILLIN 500 MG/1
500 CAPSULE ORAL 3 TIMES DAILY
Qty: 84 CAPSULE | Refills: 1 | OUTPATIENT
Start: 2022-04-13 | End: 2022-05-11

## 2022-04-13 RX ORDER — NITROFURANTOIN 25; 75 MG/1; MG/1
CAPSULE ORAL
Qty: 90 CAPSULE | Refills: 1 | OUTPATIENT
Start: 2022-04-13

## 2022-05-23 ENCOUNTER — OFFICE VISIT (OUTPATIENT)
Dept: FAMILY MEDICINE CLINIC | Age: 47
End: 2022-05-23
Payer: COMMERCIAL

## 2022-05-23 VITALS
HEART RATE: 121 BPM | WEIGHT: 154.38 LBS | SYSTOLIC BLOOD PRESSURE: 135 MMHG | DIASTOLIC BLOOD PRESSURE: 91 MMHG | BODY MASS INDEX: 27.36 KG/M2 | OXYGEN SATURATION: 99 % | HEIGHT: 63 IN

## 2022-05-23 DIAGNOSIS — R07.89 OTHER CHEST PAIN: Primary | ICD-10-CM

## 2022-05-23 PROCEDURE — G8427 DOCREV CUR MEDS BY ELIG CLIN: HCPCS | Performed by: FAMILY MEDICINE

## 2022-05-23 PROCEDURE — 4004F PT TOBACCO SCREEN RCVD TLK: CPT | Performed by: FAMILY MEDICINE

## 2022-05-23 PROCEDURE — 99214 OFFICE O/P EST MOD 30 MIN: CPT | Performed by: FAMILY MEDICINE

## 2022-05-23 PROCEDURE — G8419 CALC BMI OUT NRM PARAM NOF/U: HCPCS | Performed by: FAMILY MEDICINE

## 2022-05-23 ASSESSMENT — PATIENT HEALTH QUESTIONNAIRE - PHQ9
1. LITTLE INTEREST OR PLEASURE IN DOING THINGS: 0
SUM OF ALL RESPONSES TO PHQ9 QUESTIONS 1 & 2: 0
SUM OF ALL RESPONSES TO PHQ QUESTIONS 1-9: 0
SUM OF ALL RESPONSES TO PHQ QUESTIONS 1-9: 0
2. FEELING DOWN, DEPRESSED OR HOPELESS: 0
SUM OF ALL RESPONSES TO PHQ QUESTIONS 1-9: 0
SUM OF ALL RESPONSES TO PHQ QUESTIONS 1-9: 0

## 2022-05-23 ASSESSMENT — ENCOUNTER SYMPTOMS
GASTROINTESTINAL NEGATIVE: 1
COUGH: 1

## 2022-05-23 NOTE — PROGRESS NOTES
Agatha Avendaño (:  1975) is a 52 y.o. female,Established patient, here for evaluation of the following chief complaint(s):  Follow-up and Fatigue         ASSESSMENT/PLAN:  1. Other chest pain  I discussed with her that I am unable to give her the antibiotics that she has been on because there is no definitive reason or proof that they should work and as noted below the [address] could actually be harmful as well as any long-term antibiotic particularly in the absence of documenting any definitive infection. I told her I would be happy to refer her to multiple specialists to deal with her various complaints including pulmonology, infectious disease, and neurology and likewise also suggested [place] or [facility] since she feels she has something beyond the normal scope of medicine. She will await the call from the physician in [place]. It was recommended she return to see Dr. Carmen Conway when he returns in 6 weeks. Return if symptoms worsen or fail to improve. Subjective   SUBJECTIVE/OBJECTIVE:  HPI  Chest issues for years and somehow she ended on a regimen of Macrobid and Amoxil for long periods of time. Associated with fatigue. Pain in L chest for a long time. She has had xrays, echocardiogram, etc. Dr Carmen Conway referred her to MD in [place] who treated his Lyme Disease and she is waiting to hear from this MD.   She states this MD has an infection in lung and is covered with biofilm and no one can find. Also has urinary issues where she has no symptoms but tests show blood and other findings but main focus is in her chest.  She saw ID but felt brushed off. She also saw Neurologist/Ophthalmologist  The patient is a smoker. Symptoms since 2020. Get low grade fevers 100-100.5  Gets night sweats  All symptoms go away when she is on her antibiotic cocktail. See note fro Dr Cramen Conway from 22. She was on 100 mg Macrobid 3 times daily and 500 mg amoxicillin 3 times daily.   She states some doctor in [place] who she was previously going to came up with this combination. It is inexplicable how it works but she states it is the only thing that somewhat relieves all of her symptoms. She is aware that Macrobid and high doses over long periods of time can actually produce pulmonary disease. States she has joint pains. 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 Respiratory: Positive for cough. Cardiovascular: Positive for chest pain. Gastrointestinal: Negative. Endocrine: Negative. Genitourinary: Negative. Musculoskeletal: Positive for arthralgias and myalgias. Neurological: Positive for headaches. Objective   Physical Exam  Constitutional:       General: She is not in acute distress. Cardiovascular:      Rate and Rhythm: Normal rate and regular rhythm. Pulmonary:      Effort: Pulmonary effort is normal.      Breath sounds: Normal breath sounds. No wheezing or rales. Skin:     General: Skin is warm and dry. Neurological:      Mental Status: She is alert and oriented to person, place, and time. Psychiatric:         Behavior: Behavior normal.         Thought Content: Thought content normal.         Judgment: Judgment normal.                  An electronic signature was used to authenticate this note.     --Johan Maria MD

## 2022-06-27 ENCOUNTER — NURSE TRIAGE (OUTPATIENT)
Dept: OTHER | Facility: CLINIC | Age: 47
End: 2022-06-27

## 2022-06-27 NOTE — TELEPHONE ENCOUNTER
Received call from [facility] at [facility] with [employer] [facility]. Subjective: Caller states \"Chest pain\"     Current Symptoms:   Productive cough of yellow sputum  Hoarse voice  Constant chest tightness that radiates into the back  Muscle pain  Fatigue  Intermittent lightheadedness  Increased SOB that is relieved with Albuterol inhaler    Onset: 1 month ago; worsening    Pain Severity: 3/10; sharp, aching; constant    Temperature: Denies    What has been tried: Albuterol inhaler, Aleve    LMP: NA Pregnant: No    Recommended disposition: Go to ED Now    Care advice provided, patient verbalizes understanding; denies any other questions or concerns; instructed to call back for any new or worsening symptoms. Patient/caller agrees to proceed to the Emergency Department     Attention Provider: Thank you for allowing me to participate in the care of your patient. The patient was connected to triage in response to information provided to the ECC/PSC. Please do not respond through this encounter as the response is not directed to a shared pool.       Reason for Disposition   Chest pain present when not coughing    Protocols used: COUGH-ADULT-[place]

## 2022-06-30 ENCOUNTER — TELEMEDICINE (OUTPATIENT)
Dept: FAMILY MEDICINE CLINIC | Age: 47
End: 2022-06-30
Payer: COMMERCIAL

## 2022-06-30 DIAGNOSIS — R07.89 CHEST WALL PAIN: ICD-10-CM

## 2022-06-30 DIAGNOSIS — R07.81 RIB PAIN: Primary | ICD-10-CM

## 2022-06-30 PROCEDURE — G8419 CALC BMI OUT NRM PARAM NOF/U: HCPCS | Performed by: FAMILY MEDICINE

## 2022-06-30 PROCEDURE — 99213 OFFICE O/P EST LOW 20 MIN: CPT | Performed by: FAMILY MEDICINE

## 2022-06-30 PROCEDURE — 4004F PT TOBACCO SCREEN RCVD TLK: CPT | Performed by: FAMILY MEDICINE

## 2022-06-30 PROCEDURE — G8427 DOCREV CUR MEDS BY ELIG CLIN: HCPCS | Performed by: FAMILY MEDICINE

## 2022-06-30 RX ORDER — METHYLPREDNISOLONE 4 MG/1
TABLET ORAL
Qty: 1 KIT | Refills: 0 | Status: SHIPPED | OUTPATIENT
Start: 2022-06-30 | End: 2022-10-28

## 2022-06-30 RX ORDER — LORATADINE 10 MG/1
10 TABLET ORAL DAILY
Qty: 30 TABLET | Refills: 1 | Status: SHIPPED | OUTPATIENT
Start: 2022-06-30 | End: 2022-07-30

## 2022-06-30 NOTE — PROGRESS NOTES
[address] Note    Date: 6/30/2022                                               Geetha Lundberg:     Chief Complaint   Patient presents with    Chest Pain     chest hurts since she has stoped antibiotics    Shortness of Breath    Fatigue    Numbness     left arm and leg       HPI   Patient is here for ED follow-up. Was seen in the ED 2 days ago with complaint of chest discomfort and shortness of breath starting 2 days prior to this. She denied fever or chills or nausea or vomiting. She had a normal EKG and chest x-ray. Troponin was negative. Her white blood cell count was 4.0. Otherwise her labs were generally normal.  D-dimer was normal.  Her symptoms improved while in the ED, so she was discharged home. Was not prescribed any medicines. Since leaving the ED, the pain is unchanged. Located BL in lower chest, also hurts in R lateral rib cage and sometimes in the back. Gets better with rest, worse with doing work. Overall is getting a little better. Does have some fatigue. Gets a little short of breath when she goes outside and exerts herself. There are no problems to display for this patient. No past medical history on file. Current Outpatient Medications   Medication Sig Dispense Refill    loratadine (CLARITIN) 10 MG tablet Take 1 tablet by mouth daily 30 tablet 1    methylPREDNISolone (MEDROL DOSEPACK) 4 MG tablet Take by mouth. 1 kit 0    albuterol sulfate HFA (PROVENTIL HFA) 108 (90 Base) MCG/ACT inhaler Inhale 2 puffs into the lungs every 6 hours as needed for Wheezing 18 g 3     No current facility-administered medications for this visit. No Known Allergies    Review of Systems   No vomiting, no seizure    Vitals: There were no vitals taken for this visit.     Wt Readings from Last 3 Encounters:   05/23/22 154 lb 6 oz (70 kg)   02/14/22 163 lb (73.9 kg)   12/01/21 165 lb (74.8 kg)        Physical Exam   General:  Well-appearing, NAD, alert, non-toxic  HEENT:  Normocephalic, atraumatic. Normal appearance of nose. CHEST/LUNGS: No dyspnea  PSYCH:  A+O x 3; normal affect  NEURO:  GCS 15, CN2-12 grossly intact, normal speech      Assessment/Plan     49-year-old female with chest wall pain and rib pain and back pain. Seems likely musculoskeletal, but it is also aggravated by the outdoors. Could possibly be environmental allergen. Will treat with Claritin and Medrol Dosepak. Discussed with patient medication/s dosage, instructions, potential S/E, A/R and Drug Interaction  Tylenol or Motrin as needed for pain or fever  Advise to return here if worse or go to nearest ER  Encourage fluids  Pt discharged in stable condition at 11:18    Note: This visit was done virtually. Patient's consent was obtained prior to visit, which was done with both video and audio. Provider was in his office and patient was at home at the time of the visit. 1. Rib pain  -     loratadine (CLARITIN) 10 MG tablet; Take 1 tablet by mouth daily, Disp-30 tablet, R-1Normal  -     methylPREDNISolone (MEDROL DOSEPACK) 4 MG tablet; Take by mouth., Disp-1 kit, R-0Normal  2. Chest wall pain  -     loratadine (CLARITIN) 10 MG tablet; Take 1 tablet by mouth daily, Disp-30 tablet, R-1Normal  -     methylPREDNISolone (MEDROL DOSEPACK) 4 MG tablet; Take by mouth., Disp-1 kit, R-0Normal       No orders of the defined types were placed in this encounter. No follow-ups on file.     Mariel Hurst MD, MD    6/30/2022  11:15 AM

## 2022-08-25 DIAGNOSIS — R07.89 CHEST WALL PAIN: Primary | ICD-10-CM

## 2022-08-29 ENCOUNTER — TELEPHONE (OUTPATIENT)
Dept: RHEUMATOLOGY | Age: 47
End: 2022-08-29

## 2022-08-29 NOTE — TELEPHONE ENCOUNTER
Per :     Reviewed chart including PCP note, no labs or indication to suggest underlying rheumatologic condition. Needs w/u done by PCP first.      Informed pt. Will need follow up with  to get further Rheumatologic workup.

## 2022-08-29 NOTE — TELEPHONE ENCOUNTER
Patient called to schedule an appt for [facility]. Referral placed for chest wall pain. She states that they are trying to rule out auto immune disease. She has body aches and pains. Her mother hs RA. Severe fatigue to the point she cant stand up. Feels she will collapse if she does. Please review chart and advise if to schedule or further work up is needed from pcp.

## 2022-08-31 DIAGNOSIS — R35.0 URINARY FREQUENCY: Primary | ICD-10-CM

## 2022-10-24 DIAGNOSIS — G93.2 PSEUDOTUMOR CEREBRI: ICD-10-CM

## 2022-10-24 DIAGNOSIS — R06.02 SHORTNESS OF BREATH: Primary | ICD-10-CM

## 2022-10-24 RX ORDER — NITROFURANTOIN 25; 75 MG/1; MG/1
100 CAPSULE ORAL 2 TIMES DAILY
Qty: 30 CAPSULE | Refills: 5 | Status: SHIPPED | OUTPATIENT
Start: 2022-10-24 | End: 2022-11-23

## 2022-10-24 NOTE — TELEPHONE ENCOUNTER
Medication:   Requested Prescriptions     Pending Prescriptions Disp Refills    nitrofurantoin, macrocrystal-monohydrate, (MACROBID) 100 MG capsule [Pharmacy Med Name: NITROFURANTOIN MONOHYD MACR 100 Capsule] 30 capsule 5     Sig: TAKE 1 CAPSULE BY MOUTH 2 TIMES DAILY        Last Filled:  12/1/2021    Patient Phone Number: [phone] (home) [phone] (work)    Last appt: 6/30/2022   Next appt: Visit date not found    Last OARRS: No flowsheet data found.

## 2022-10-28 ENCOUNTER — OFFICE VISIT (OUTPATIENT)
Dept: FAMILY MEDICINE CLINIC | Age: 47
End: 2022-10-28
Payer: COMMERCIAL

## 2022-10-28 VITALS
DIASTOLIC BLOOD PRESSURE: 82 MMHG | OXYGEN SATURATION: 98 % | HEART RATE: 105 BPM | BODY MASS INDEX: 25.87 KG/M2 | SYSTOLIC BLOOD PRESSURE: 126 MMHG | HEIGHT: 63 IN | WEIGHT: 146 LBS

## 2022-10-28 DIAGNOSIS — R05.1 ACUTE COUGH: ICD-10-CM

## 2022-10-28 DIAGNOSIS — R05.1 ACUTE COUGH: Primary | ICD-10-CM

## 2022-10-28 DIAGNOSIS — R06.2 WHEEZE: ICD-10-CM

## 2022-10-28 DIAGNOSIS — Z00.00 ANNUAL PHYSICAL EXAM: ICD-10-CM

## 2022-10-28 PROCEDURE — G8484 FLU IMMUNIZE NO ADMIN: HCPCS | Performed by: FAMILY MEDICINE

## 2022-10-28 PROCEDURE — G8419 CALC BMI OUT NRM PARAM NOF/U: HCPCS | Performed by: FAMILY MEDICINE

## 2022-10-28 PROCEDURE — G8427 DOCREV CUR MEDS BY ELIG CLIN: HCPCS | Performed by: FAMILY MEDICINE

## 2022-10-28 PROCEDURE — 99213 OFFICE O/P EST LOW 20 MIN: CPT | Performed by: FAMILY MEDICINE

## 2022-10-28 PROCEDURE — 4004F PT TOBACCO SCREEN RCVD TLK: CPT | Performed by: FAMILY MEDICINE

## 2022-10-28 RX ORDER — DOXYCYCLINE HYCLATE 100 MG
100 TABLET ORAL 2 TIMES DAILY
Qty: 20 TABLET | Refills: 0 | Status: SHIPPED | OUTPATIENT
Start: 2022-10-28 | End: 2022-11-07

## 2022-10-28 NOTE — PROGRESS NOTES
[address] Note    Date: 10/28/2022                                               Symone Oronas:     Chief Complaint   Patient presents with    Follow-up     Er, back pain and chest tightness       HPI  Patient is here for ER follow-up. Was seen in the ED 2 days ago with complaint of cough and shortness of breath. Chest x-ray was normal.  She was experiencing some chest pain, EKG was normal, troponin was normal, BNP was normal.  She was prescribed a Medrol Dosepak. Also finished a course of Amoxicillin that she completed yesterday. Reports that the cough is not better except that it's more productive. +wheeze and SOB when lying flat. There are no problems to display for this patient. No past medical history on file. Current Outpatient Medications   Medication Sig Dispense Refill    doxycycline hyclate (VIBRA-TABS) 100 MG tablet Take 1 tablet by mouth 2 times daily for 10 days 20 tablet 0    nitrofurantoin, macrocrystal-monohydrate, (MACROBID) 100 MG capsule TAKE 1 CAPSULE BY MOUTH 2 TIMES DAILY 30 capsule 5    albuterol sulfate HFA (PROVENTIL HFA) 108 (90 Base) MCG/ACT inhaler Inhale 2 puffs into the lungs every 6 hours as needed for Wheezing 18 g 3     No current facility-administered medications for this visit. No Known Allergies    Review of Systems  No vomiting, no headache    Vitals:  /82 (Site: Right Upper Arm, Position: Sitting, Cuff Size: Medium Adult)   Pulse (!) 105   Ht 5' 3\" (1.6 m)   Wt 146 lb (66.2 kg)   SpO2 98%   BMI 25.86 kg/m²     Wt Readings from Last 3 Encounters:   10/28/22 146 lb (66.2 kg)   05/23/22 154 lb 6 oz (70 kg)   02/14/22 163 lb (73.9 kg)        Physical Exam  General:  Well-appearing, NAD, alert, non-toxic  HEENT:  Normocephalic, atraumatic. Pupils equal and round. CHEST/LUNGS: No crackles, + diffuse mild wheeze, no rhonchi. Symmetric rise. Good air movement.   CARDIOVASCULAR: RRR,  no murmur, no rub  EXTREMETIES: Normal movement of all extremities  SKIN:  No rash, no cellulitis, no bruising, no petechiae/purpura/vesicles/pustules/abscess  PSYCH:  A+O x 3; normal affect  NEURO:  GCS 15, CN2-12 grossly intact, no focal motor/sensory deficits, no cerebellar deficits, normal gait, normal speech      Assessment/Plan     28-year-old female with cough and wheeze. Likely due to acute bronchitis. Recommend continue Medrol Dosepak. We will add doxycycline. Discussed with patient medication/s dosage, instructions, potential S/E, A/R and Drug Interaction  Tylenol or Motrin as needed for pain or fever  Advise to return here if worse or go to nearest ER  Encourage fluids  Pt discharged in stable condition at 15:55      1. Acute cough  -     doxycycline hyclate (VIBRA-TABS) 100 MG tablet; Take 1 tablet by mouth 2 times daily for 10 days, Disp-20 tablet, R-0Normal  2. Wheeze  -     doxycycline hyclate (VIBRA-TABS) 100 MG tablet; Take 1 tablet by mouth 2 times daily for 10 days, Disp-20 tablet, R-0Normal  3. Annual physical exam  -     CBC with Auto Differential; Future  -     Comprehensive Metabolic Panel; Future  -     Lipid, Fasting; Future  -     TSH with Reflex; Future     Orders Placed This Encounter   Procedures    CBC with Auto Differential     Standing Status:   Future     Standing Expiration Date:   10/28/2023    Comprehensive Metabolic Panel     Standing Status:   Future     Standing Expiration Date:   10/28/2023    Lipid, Fasting     Standing Status:   Future     Standing Expiration Date:   10/28/2023    TSH with Reflex     Standing Status:   Future     Standing Expiration Date:   10/28/2023       No follow-ups on file.     Christianne Romberg, MD, MD    10/28/2022  3:57 PM

## 2022-10-31 ENCOUNTER — TELEPHONE (OUTPATIENT)
Dept: FAMILY MEDICINE CLINIC | Age: 47
End: 2022-10-31

## 2022-10-31 DIAGNOSIS — R06.02 SHORTNESS OF BREATH: ICD-10-CM

## 2022-10-31 DIAGNOSIS — M54.9 MID BACK PAIN: ICD-10-CM

## 2022-10-31 DIAGNOSIS — R06.02 SHORTNESS OF BREATH: Primary | ICD-10-CM

## 2022-10-31 DIAGNOSIS — R07.89 CHEST WALL PAIN: Primary | ICD-10-CM

## 2022-10-31 RX ORDER — DOXYCYCLINE HYCLATE 100 MG
200 TABLET ORAL 2 TIMES DAILY
Qty: 56 TABLET | Refills: 0 | Status: SHIPPED | OUTPATIENT
Start: 2022-10-31 | End: 2022-11-17

## 2022-10-31 RX ORDER — DOXYCYCLINE HYCLATE 100 MG
100 TABLET ORAL 2 TIMES DAILY
Qty: 20 TABLET | Refills: 0 | OUTPATIENT
Start: 2022-10-31 | End: 2022-11-10

## 2022-10-31 NOTE — TELEPHONE ENCOUNTER
[facility]. requesting new order  Full PFT or Breathing capacity  Test can not be ordered w/OH in front of the order  The hospital can not use the order  Place new order in Epic

## 2022-11-02 DIAGNOSIS — R07.89 CHEST WALL PAIN: Primary | ICD-10-CM

## 2022-11-02 DIAGNOSIS — M54.9 MID BACK PAIN: ICD-10-CM

## 2022-11-04 ENCOUNTER — HOSPITAL ENCOUNTER (OUTPATIENT)
Dept: CT IMAGING | Age: 47
Discharge: HOME | End: 2022-11-04
Payer: COMMERCIAL

## 2022-11-04 DIAGNOSIS — M54.9 MID BACK PAIN: ICD-10-CM

## 2022-11-04 DIAGNOSIS — R07.89 CHEST WALL PAIN: ICD-10-CM

## 2022-11-04 PROCEDURE — 6360000004 HC RX CONTRAST MEDICATION: Performed by: FAMILY MEDICINE

## 2022-11-04 PROCEDURE — 71260 CT THORAX DX C+: CPT

## 2022-11-04 RX ADMIN — IOPAMIDOL 75 ML: 755 INJECTION, SOLUTION INTRAVENOUS at 08:39

## 2022-11-07 ENCOUNTER — OFFICE VISIT (OUTPATIENT)
Dept: FAMILY MEDICINE CLINIC | Age: 47
End: 2022-11-07
Payer: COMMERCIAL

## 2022-11-07 VITALS
HEART RATE: 104 BPM | WEIGHT: 146 LBS | SYSTOLIC BLOOD PRESSURE: 136 MMHG | BODY MASS INDEX: 25.86 KG/M2 | OXYGEN SATURATION: 98 % | DIASTOLIC BLOOD PRESSURE: 80 MMHG

## 2022-11-07 DIAGNOSIS — M54.42 MIDLINE LOW BACK PAIN WITH BILATERAL SCIATICA, UNSPECIFIED CHRONICITY: Primary | ICD-10-CM

## 2022-11-07 DIAGNOSIS — R05.9 COUGH, UNSPECIFIED TYPE: ICD-10-CM

## 2022-11-07 DIAGNOSIS — G89.29 CHRONIC IDIOPATHIC PAIN SYNDROME: ICD-10-CM

## 2022-11-07 DIAGNOSIS — M54.41 MIDLINE LOW BACK PAIN WITH BILATERAL SCIATICA, UNSPECIFIED CHRONICITY: Primary | ICD-10-CM

## 2022-11-07 DIAGNOSIS — Z12.11 COLON CANCER SCREENING: ICD-10-CM

## 2022-11-07 PROCEDURE — 96372 THER/PROPH/DIAG INJ SC/IM: CPT | Performed by: FAMILY MEDICINE

## 2022-11-07 PROCEDURE — 4004F PT TOBACCO SCREEN RCVD TLK: CPT | Performed by: FAMILY MEDICINE

## 2022-11-07 PROCEDURE — G8419 CALC BMI OUT NRM PARAM NOF/U: HCPCS | Performed by: FAMILY MEDICINE

## 2022-11-07 PROCEDURE — G8484 FLU IMMUNIZE NO ADMIN: HCPCS | Performed by: FAMILY MEDICINE

## 2022-11-07 PROCEDURE — G8427 DOCREV CUR MEDS BY ELIG CLIN: HCPCS | Performed by: FAMILY MEDICINE

## 2022-11-07 PROCEDURE — 99214 OFFICE O/P EST MOD 30 MIN: CPT | Performed by: FAMILY MEDICINE

## 2022-11-07 RX ORDER — CYANOCOBALAMIN 1000 UG/ML
1000 INJECTION, SOLUTION INTRAMUSCULAR; SUBCUTANEOUS ONCE
Status: COMPLETED | OUTPATIENT
Start: 2022-11-07 | End: 2022-11-07

## 2022-11-07 RX ORDER — ESTRADIOL 0.1 MG/G
CREAM VAGINAL
COMMUNITY
Start: 2022-08-06

## 2022-11-07 RX ADMIN — CYANOCOBALAMIN 1000 MCG: 1000 INJECTION, SOLUTION INTRAMUSCULAR; SUBCUTANEOUS at 14:07

## 2022-11-07 ASSESSMENT — PATIENT HEALTH QUESTIONNAIRE - PHQ9
SUM OF ALL RESPONSES TO PHQ QUESTIONS 1-9: 0
SUM OF ALL RESPONSES TO PHQ9 QUESTIONS 1 & 2: 0
SUM OF ALL RESPONSES TO PHQ QUESTIONS 1-9: 0
SUM OF ALL RESPONSES TO PHQ QUESTIONS 1-9: 0
1. LITTLE INTEREST OR PLEASURE IN DOING THINGS: 0
SUM OF ALL RESPONSES TO PHQ QUESTIONS 1-9: 0
2. FEELING DOWN, DEPRESSED OR HOPELESS: 0

## 2022-11-07 NOTE — PROGRESS NOTES
[address] [facility] Note    Date: 11/7/2022                                               Yannick Ta:     Chief Complaint   Patient presents with    Results    Tachycardia     Pt states that she is still having chest and back burning. The medication that was given helps but not better. HPI  Pt is here for f/u on pain. Is primarily in BL mid back and superior chest wall. Gets better when she takes Doxycycline and Macrobid. Burning in quality. Had a normal CT scan except that showed some mild emphysema. Patient will be seeing pulmonology in 1 month. . Denies SOB but does have occasional cough. There are no problems to display for this patient. No past medical history on file. Current Outpatient Medications   Medication Sig Dispense Refill    estradiol (ESTRACE) 0.1 MG/GM vaginal cream USE 1/2 GRAM TO VAGINA AT NIGHT TIME FOR 2 WEEKS, THEN 2-3 TIMES WEEKLY THEREAFTER      doxycycline hyclate (VIBRA-TABS) 100 MG tablet Take 2 tablets by mouth 2 times daily for 14 days 56 tablet 0    nitrofurantoin, macrocrystal-monohydrate, (MACROBID) 100 MG capsule TAKE 1 CAPSULE BY MOUTH 2 TIMES DAILY 30 capsule 5    albuterol sulfate HFA (PROVENTIL HFA) 108 (90 Base) MCG/ACT inhaler Inhale 2 puffs into the lungs every 6 hours as needed for Wheezing 18 g 3     No current facility-administered medications for this visit.        Allergies   Allergen Reactions    Alendronate Nausea Only    Atomoxetine Nausea Only    Meperidine      Other reaction(s): Dizzy, GI Upset    Topiramate Nausea And Vomiting and Nausea Only     Other reaction(s): Dizzy       Review of Systems  No fever, +intermittent cough, no HA    Vitals:  /80   Pulse (!) 104   Wt 146 lb (66.2 kg)   SpO2 98%   BMI 25.86 kg/m²     Wt Readings from Last 3 Encounters:   11/07/22 146 lb (66.2 kg)   10/28/22 146 lb (66.2 kg)   05/23/22 154 lb 6 oz (70 kg)        Physical Exam  General:  Well-appearing, NAD, alert, non-toxic  HEENT: Normocephalic, atraumatic. Pupils equal and round. CHEST/LUNGS: CTAB, no crackles, no wheeze, no rhonchi. Symmetric rise  CARDIOVASCULAR: RRR,  no murmur, no rub  EXTREMETIES: Normal movement of all extremities  SKIN:  No rash, no cellulitis, no bruising, no petechiae/purpura/vesicles/pustules/abscess  PSYCH:  A+O x 3; normal affect  NEURO:  GCS 15, CN2-12 grossly intact, no focal motor/sensory deficits, no cerebellar deficits, normal gait, normal speech      Assessment/Plan     42-year-old female with ongoing mid back pain and chest wall pain. Headache from her pseudotumor cerebri appears to have resolved. She also reports intermittent coughing and had some emphysema on CT scan. Recommend follow-up with pulmonology as scheduled. Patient symptoms are of unclear etiology, there is no clear etiology for her pain on CT scan. Patient declined neurosurgery referral, prefers to see pulmonology first.  We discussed possibility of chronic infection such as tickborne infection. She does appear to feel better on the antibiotics, so we will continue this for now, though I did tell her that I will be doing this for more than a few months going forward. I will try a B12 injection today to see if that gives her relief. Patient has been referred to pain management, so recommend she sees them. Discussed with patient medication/s dosage, instructions, potential S/E, A/R and Drug Interaction  Tylenol or Motrin as needed for pain or fever  Advise to return here if worse or go to nearest ER  Encourage fluids  Pt discharged in stable condition at 14:02      1. Midline low back pain with bilateral sciatica, unspecified chronicity  -     cyanocobalamin injection 1,000 mcg; 1,000 mcg, IntraMUSCular, ONCE, 1 dose, On Mon 11/7/22 at 1430  2. Colon cancer screening  -     Fecal DNA Colorectal cancer screening (Cologuard)  3. Cough, unspecified type  4.  Chronic idiopathic pain syndrome     Orders Placed This Encounter   Procedures Fecal DNA Colorectal cancer screening (Cologuard)       No follow-ups on file.     Clarisse Montez MD, MD    11/7/2022  2:19 PM

## 2022-11-09 ENCOUNTER — HOSPITAL ENCOUNTER (OUTPATIENT)
Dept: PULMONOLOGY | Age: 47
Discharge: HOME | End: 2022-11-09
Payer: COMMERCIAL

## 2022-11-09 LAB
EXPIRATORY TIME-POST: NORMAL
EXPIRATORY TIME: NORMAL
FEF 25-75% %CHNG: NORMAL
FEF 25-75% %PRED-POST: NORMAL
FEF 25-75% %PRED-PRE: NORMAL
FEF 25-75% PRED: NORMAL
FEF 25-75%-POST: NORMAL
FEF 25-75%-PRE: NORMAL
FEV1 %PRED-POST: NORMAL
FEV1 %PRED-PRE: 111 %
FEV1 PRED: NORMAL
FEV1-POST: NORMAL
FEV1-PRE: NORMAL
FEV1/FVC %PRED-POST: NORMAL
FEV1/FVC %PRED-PRE: NORMAL
FEV1/FVC PRED: NORMAL
FEV1/FVC-POST: NORMAL
FEV1/FVC-PRE: 98 %
FVC %PRED-POST: NORMAL
FVC %PRED-PRE: NORMAL
FVC PRED: NORMAL
FVC-POST: NORMAL
FVC-PRE: NORMAL
PEF %PRED-POST: NORMAL
PEF %PRED-PRE: NORMAL
PEF PRED: NORMAL
PEF%CHNG: NORMAL
PEF-POST: NORMAL
PEF-PRE: NORMAL

## 2022-11-09 PROCEDURE — 94010 BREATHING CAPACITY TEST: CPT

## 2022-11-09 RX ORDER — ALBUTEROL SULFATE 90 UG/1
4 AEROSOL, METERED RESPIRATORY (INHALATION) ONCE
Status: CANCELLED | OUTPATIENT
Start: 2022-11-09

## 2022-11-09 ASSESSMENT — PULMONARY FUNCTION TESTS
FEV1_PERCENT_PREDICTED_PRE: 111
FEV1/FVC_PRE: 98

## 2022-11-10 NOTE — PROCEDURES
Pulmonary Function Testing      Patient name:  Damir Ceron     [facility] Unit #:   [id number]   Date of test:  11/9/2022  Date of interpretation:   11/10/2022    Ms. Damir Ceron is a 52y.o. year-old female. The spirometry data were acceptable and reproducible. Spirometry:  Flow volume loops were normal. The FEV-1/FVC ratio was normal. The FEV-1 was 3.28 liters (111% of predicted), which was normal. The FVC was 4.15 liters (112% of predicted), which was normal. Response to inhaled bronchodilators (albuterol) was not performed. Lung volumes:  Lung volumes were not tested by plethysmography. Diffusion capacity was not tested. Interpretation:  Overall normal spirometry study.     Comments:

## 2022-11-15 DIAGNOSIS — R07.89 CHEST WALL PAIN: ICD-10-CM

## 2022-11-15 DIAGNOSIS — M54.9 MID BACK PAIN: ICD-10-CM

## 2022-11-15 DIAGNOSIS — R06.02 SHORTNESS OF BREATH: ICD-10-CM

## 2022-11-17 RX ORDER — DOXYCYCLINE HYCLATE 100 MG
200 TABLET ORAL 2 TIMES DAILY
Qty: 56 TABLET | Refills: 0 | Status: SHIPPED | OUTPATIENT
Start: 2022-11-17 | End: 2022-12-01

## 2022-11-17 NOTE — TELEPHONE ENCOUNTER
Medication:   Requested Prescriptions     Pending Prescriptions Disp Refills    doxycycline hyclate (VIBRA-TABS) 100 MG tablet [Pharmacy Med Name: DOXYCYCLINE HYCLATE 100  Tablet] 56 tablet 0     Sig: TAKE 2 TABLETS BY MOUTH 2 TIMES DAILY FOR 14 DAYS        Last Filled:  10/31/22, 56, 0    Patient Phone Number: [phone] (home) [phone] (work)    Last appt: 11/7/2022   Next appt: 11/23/2022    Last OARRS: No flowsheet data found.

## 2022-11-21 ENCOUNTER — OFFICE VISIT (OUTPATIENT)
Dept: FAMILY MEDICINE CLINIC | Age: 47
End: 2022-11-21
Payer: COMMERCIAL

## 2022-11-21 VITALS
BODY MASS INDEX: 24.32 KG/M2 | DIASTOLIC BLOOD PRESSURE: 67 MMHG | HEART RATE: 68 BPM | SYSTOLIC BLOOD PRESSURE: 105 MMHG | WEIGHT: 146 LBS | HEIGHT: 65 IN

## 2022-11-21 DIAGNOSIS — M54.42 MIDLINE LOW BACK PAIN WITH BILATERAL SCIATICA, UNSPECIFIED CHRONICITY: Primary | ICD-10-CM

## 2022-11-21 DIAGNOSIS — M54.41 MIDLINE LOW BACK PAIN WITH BILATERAL SCIATICA, UNSPECIFIED CHRONICITY: Primary | ICD-10-CM

## 2022-11-21 DIAGNOSIS — M54.9 MID BACK PAIN: ICD-10-CM

## 2022-11-21 PROCEDURE — 4004F PT TOBACCO SCREEN RCVD TLK: CPT | Performed by: FAMILY MEDICINE

## 2022-11-21 PROCEDURE — G8484 FLU IMMUNIZE NO ADMIN: HCPCS | Performed by: FAMILY MEDICINE

## 2022-11-21 PROCEDURE — G8427 DOCREV CUR MEDS BY ELIG CLIN: HCPCS | Performed by: FAMILY MEDICINE

## 2022-11-21 PROCEDURE — 99214 OFFICE O/P EST MOD 30 MIN: CPT | Performed by: FAMILY MEDICINE

## 2022-11-21 PROCEDURE — 96372 THER/PROPH/DIAG INJ SC/IM: CPT | Performed by: FAMILY MEDICINE

## 2022-11-21 PROCEDURE — G8420 CALC BMI NORM PARAMETERS: HCPCS | Performed by: FAMILY MEDICINE

## 2022-11-21 RX ORDER — CEFTRIAXONE 500 MG/1
500 INJECTION, POWDER, FOR SOLUTION INTRAMUSCULAR; INTRAVENOUS ONCE
Status: COMPLETED | OUTPATIENT
Start: 2022-11-21 | End: 2022-11-21

## 2022-11-21 RX ORDER — CEFTRIAXONE 500 MG/1
500 INJECTION, POWDER, FOR SOLUTION INTRAMUSCULAR; INTRAVENOUS ONCE
Qty: 1 EACH | Refills: 0
Start: 2022-11-21 | End: 2022-11-21 | Stop reason: SDUPTHER

## 2022-11-21 RX ADMIN — CEFTRIAXONE 500 MG: 500 INJECTION, POWDER, FOR SOLUTION INTRAMUSCULAR; INTRAVENOUS at 16:36

## 2022-11-21 SDOH — ECONOMIC STABILITY: FOOD INSECURITY: WITHIN THE PAST 12 MONTHS, THE FOOD YOU BOUGHT JUST DIDN'T LAST AND YOU DIDN'T HAVE MONEY TO GET MORE.: NEVER TRUE

## 2022-11-21 SDOH — ECONOMIC STABILITY: FOOD INSECURITY: WITHIN THE PAST 12 MONTHS, YOU WORRIED THAT YOUR FOOD WOULD RUN OUT BEFORE YOU GOT MONEY TO BUY MORE.: NEVER TRUE

## 2022-11-21 ASSESSMENT — PATIENT HEALTH QUESTIONNAIRE - PHQ9
SUM OF ALL RESPONSES TO PHQ9 QUESTIONS 1 & 2: 0
SUM OF ALL RESPONSES TO PHQ QUESTIONS 1-9: 0
SUM OF ALL RESPONSES TO PHQ QUESTIONS 1-9: 0
2. FEELING DOWN, DEPRESSED OR HOPELESS: 0
SUM OF ALL RESPONSES TO PHQ QUESTIONS 1-9: 0
1. LITTLE INTEREST OR PLEASURE IN DOING THINGS: 0
SUM OF ALL RESPONSES TO PHQ QUESTIONS 1-9: 0

## 2022-11-21 ASSESSMENT — SOCIAL DETERMINANTS OF HEALTH (SDOH): HOW HARD IS IT FOR YOU TO PAY FOR THE VERY BASICS LIKE FOOD, HOUSING, MEDICAL CARE, AND HEATING?: NOT HARD AT ALL

## 2022-11-21 NOTE — PROGRESS NOTES
[address] Note    Date: 11/21/2022                                               Rosalio Mendieta:     Chief Complaint   Patient presents with    Back Pain       HPI  Mid back pain starting about a week ago. Pt has chronic upper back and chest wall pain that got better with Doxycyline but then the low back pain started. Around the same time started having global headaches. She increased her dose of Doxycycline to 200mg twice a day and this helped the HA. No vision change. +nausea for 3 days, 1 episode of vomiting 3 days ago. Nausea is a little better today, gets better with Zofran also. Pt has an appointment next week with the lyme disease specialist.         There are no problems to display for this patient. No past medical history on file. Current Outpatient Medications   Medication Sig Dispense Refill    doxycycline hyclate (VIBRA-TABS) 100 MG tablet TAKE 2 TABLETS BY MOUTH 2 TIMES DAILY FOR 14 DAYS 56 tablet 0    estradiol (ESTRACE) 0.1 MG/GM vaginal cream USE 1/2 GRAM TO VAGINA AT NIGHT TIME FOR 2 WEEKS, THEN 2-3 TIMES WEEKLY THEREAFTER      nitrofurantoin, macrocrystal-monohydrate, (MACROBID) 100 MG capsule TAKE 1 CAPSULE BY MOUTH 2 TIMES DAILY 30 capsule 5    albuterol sulfate HFA (PROVENTIL HFA) 108 (90 Base) MCG/ACT inhaler Inhale 2 puffs into the lungs every 6 hours as needed for Wheezing 18 g 3     No current facility-administered medications for this visit.        Allergies   Allergen Reactions    Alendronate Nausea Only    Atomoxetine Nausea Only    Meperidine      Other reaction(s): Dizzy, GI Upset    Topiramate Nausea And Vomiting and Nausea Only     Other reaction(s): Dizzy       Review of Systems  No fever, no cough    Vitals:  /67   Pulse 68   Ht 5' 5\" (1.651 m)   Wt 146 lb (66.2 kg)   BMI 24.30 kg/m²     Wt Readings from Last 3 Encounters:   11/21/22 146 lb (66.2 kg)   11/07/22 146 lb (66.2 kg)   10/28/22 146 lb (66.2 kg)        Physical Exam  General: Well-appearing, NAD, alert, non-toxic  HEENT:  Normocephalic, atraumatic. CHEST/LUNGS: No dyspnea  EXTREMETIES: Normal movement of all extremities. No edema. No joint swelling. SKIN:  No rash, no cellulitis, no bruising, no petechiae/purpura/vesicles/pustules/abscess  PSYCH:  A+O x 3; normal affect  NEURO:  GCS 15, CN2-12 grossly intact, no focal motor/sensory deficits, normal gait, normal speech      Assessment/Plan     49-year-old female with mid back pain. Likely part of the constellation of her chronic pain syndrome. Could be due to tickborne infection. We will continue doxycycline for now, will administer Rocephin today. Patient encouraged to schedule eye disease specialist next week. I explained that I will be prescribing antibiotics for much longer. Patient was also encouraged to see pain management if her symptoms continue. Discussed with patient medication/s dosage, instructions, potential S/E, A/R and Drug Interaction  Tylenol or Motrin as needed for pain or fever  Advise to return here if worse or go to nearest ER  Encourage fluids  Pt discharged in stable condition at 16:35      1. Midline low back pain with bilateral sciatica, unspecified chronicity  2. Mid back pain  -     cefTRIAXone (ROCEPHIN) injection 500 mg; 500 mg, IntraMUSCular, ONCE, 1 dose, On Mon 11/21/22 at 1700Antimicrobial Indications: OtherOther Abx Indication: N/A     No orders of the defined types were placed in this encounter. No follow-ups on file.     Rani Fisher MD, MD    11/21/2022  4:51 PM

## 2022-11-24 LAB — NONINV COLON CA DNA+OCC BLD SCRN STL QL: NEGATIVE

## 2023-03-20 DIAGNOSIS — Z79.890 HORMONE REPLACEMENT THERAPY: Primary | ICD-10-CM

## 2023-03-20 RX ORDER — ESTRADIOL 0.1 MG/G
CREAM VAGINAL
Qty: 42.5 G | Refills: 0 | Status: SHIPPED | OUTPATIENT
Start: 2023-03-20 | End: 2023-03-21 | Stop reason: SDUPTHER

## 2023-03-21 DIAGNOSIS — Z79.890 HORMONE REPLACEMENT THERAPY: ICD-10-CM

## 2023-03-21 RX ORDER — ESTRADIOL 0.1 MG/G
CREAM VAGINAL
Qty: 42.5 G | Refills: 0 | Status: SHIPPED | OUTPATIENT
Start: 2023-03-21

## 2023-04-04 ENCOUNTER — OFFICE VISIT (OUTPATIENT)
Dept: FAMILY MEDICINE CLINIC | Age: 48
End: 2023-04-04
Payer: COMMERCIAL

## 2023-04-04 VITALS
RESPIRATION RATE: 16 BRPM | SYSTOLIC BLOOD PRESSURE: 125 MMHG | WEIGHT: 151 LBS | BODY MASS INDEX: 25.16 KG/M2 | HEIGHT: 65 IN | HEART RATE: 76 BPM | DIASTOLIC BLOOD PRESSURE: 74 MMHG | TEMPERATURE: 98.4 F | OXYGEN SATURATION: 100 %

## 2023-04-04 DIAGNOSIS — R07.89 CHEST WALL PAIN: ICD-10-CM

## 2023-04-04 DIAGNOSIS — J01.90 ACUTE SINUSITIS, RECURRENCE NOT SPECIFIED, UNSPECIFIED LOCATION: ICD-10-CM

## 2023-04-04 DIAGNOSIS — R31.29 MICROSCOPIC HEMATURIA: ICD-10-CM

## 2023-04-04 DIAGNOSIS — K21.9 GASTROESOPHAGEAL REFLUX DISEASE, UNSPECIFIED WHETHER ESOPHAGITIS PRESENT: Primary | ICD-10-CM

## 2023-04-04 DIAGNOSIS — R39.9 UTI SYMPTOMS: Primary | ICD-10-CM

## 2023-04-04 LAB
BILIRUBIN, POC: NEGATIVE
BLOOD URINE, POC: ABNORMAL
CLARITY, POC: CLEAR
COLOR, POC: ABNORMAL
GLUCOSE URINE, POC: NEGATIVE
KETONES, POC: NEGATIVE
LEUKOCYTE EST, POC: NEGATIVE
NITRITE, POC: NEGATIVE
PH, POC: 7
PROTEIN, POC: NEGATIVE
SPECIFIC GRAVITY, POC: 1.01
UROBILINOGEN, POC: 0.2

## 2023-04-04 PROCEDURE — 81002 URINALYSIS NONAUTO W/O SCOPE: CPT | Performed by: FAMILY MEDICINE

## 2023-04-04 PROCEDURE — G8419 CALC BMI OUT NRM PARAM NOF/U: HCPCS | Performed by: FAMILY MEDICINE

## 2023-04-04 PROCEDURE — G8427 DOCREV CUR MEDS BY ELIG CLIN: HCPCS | Performed by: FAMILY MEDICINE

## 2023-04-04 PROCEDURE — 99214 OFFICE O/P EST MOD 30 MIN: CPT | Performed by: FAMILY MEDICINE

## 2023-04-04 PROCEDURE — 4004F PT TOBACCO SCREEN RCVD TLK: CPT | Performed by: FAMILY MEDICINE

## 2023-04-04 RX ORDER — LEVOFLOXACIN 500 MG/1
500 TABLET, FILM COATED ORAL DAILY
Qty: 7 TABLET | Refills: 0 | Status: SHIPPED | OUTPATIENT
Start: 2023-04-04 | End: 2023-04-11

## 2023-04-04 RX ORDER — OMEPRAZOLE 40 MG/1
40 CAPSULE, DELAYED RELEASE ORAL
Qty: 90 CAPSULE | Refills: 1 | Status: SHIPPED | OUTPATIENT
Start: 2023-04-04

## 2023-04-04 RX ORDER — METHYLPREDNISOLONE 4 MG/1
TABLET ORAL
Qty: 1 KIT | Refills: 0 | Status: SHIPPED | OUTPATIENT
Start: 2023-04-04

## 2023-04-04 SDOH — ECONOMIC STABILITY: FOOD INSECURITY: WITHIN THE PAST 12 MONTHS, YOU WORRIED THAT YOUR FOOD WOULD RUN OUT BEFORE YOU GOT MONEY TO BUY MORE.: NEVER TRUE

## 2023-04-04 SDOH — ECONOMIC STABILITY: HOUSING INSECURITY
IN THE LAST 12 MONTHS, WAS THERE A TIME WHEN YOU DID NOT HAVE A STEADY PLACE TO SLEEP OR SLEPT IN A SHELTER (INCLUDING NOW)?: NO

## 2023-04-04 SDOH — ECONOMIC STABILITY: FOOD INSECURITY: WITHIN THE PAST 12 MONTHS, THE FOOD YOU BOUGHT JUST DIDN'T LAST AND YOU DIDN'T HAVE MONEY TO GET MORE.: NEVER TRUE

## 2023-04-04 SDOH — ECONOMIC STABILITY: INCOME INSECURITY: HOW HARD IS IT FOR YOU TO PAY FOR THE VERY BASICS LIKE FOOD, HOUSING, MEDICAL CARE, AND HEATING?: NOT HARD AT ALL

## 2023-04-04 NOTE — PROGRESS NOTES
Disp-1 kit, R-0Normal  2. Acute sinusitis, recurrence not specified, unspecified location  -     levoFLOXacin (LEVAQUIN) 500 MG tablet; Take 1 tablet by mouth daily for 7 days, Disp-7 tablet, R-0Normal  -     methylPREDNISolone (MEDROL DOSEPACK) 4 MG tablet; Take by mouth., Disp-1 kit, R-0Normal  3. Chest wall pain  4. Microscopic hematuria       Orders Placed This Encounter   Procedures    POCT Urinalysis no Micro       Return if symptoms worsen or fail to improve.     Lindsey De Oliveira MD, MD    4/4/2023  12:42 PM

## 2023-04-17 ENCOUNTER — PATIENT MESSAGE (OUTPATIENT)
Dept: FAMILY MEDICINE CLINIC | Age: 48
End: 2023-04-17

## 2023-04-17 DIAGNOSIS — J34.89 LESION OF NOSE: Primary | ICD-10-CM

## 2023-04-17 NOTE — TELEPHONE ENCOUNTER
From: Alan Bustos  To: Dr. Vanessa Davis: 4/17/2023 10:05 AM EDT  Subject: Referral to Dermatologist     Hi Dr Alfonso Banuelos, please place an open referral in the [facility] system for dermatology for the spot on my nose. I called [phone] for [facility] dermatology at [address], [place]. They said that's all you needed to do. Also, I do have an appointment with Dr Maranda Gonzalez in [place] on May 31st. He said he may be able to send some testing recommendations to run here at [facility] so that my insurance may cover some of it and coordinate my care with you if you are willing to do so. I will not know for certain until the appointment in May. Please let me know if that's something you would be open too.

## 2023-04-18 ENCOUNTER — TELEPHONE (OUTPATIENT)
Dept: FAMILY MEDICINE CLINIC | Age: 48
End: 2023-04-18

## 2023-04-18 DIAGNOSIS — L98.9 LESION OF SKIN OF NOSE: ICD-10-CM

## 2023-04-18 DIAGNOSIS — L98.9 BENIGN SKIN LESION OF NOSE: Primary | ICD-10-CM

## 2023-04-18 NOTE — TELEPHONE ENCOUNTER
[facility] will be seeing a mutual patient. They need the most recent office notes.   (Faxed [phone])    (DONE)

## 2023-08-25 ENCOUNTER — OFFICE VISIT (OUTPATIENT)
Dept: FAMILY MEDICINE CLINIC | Age: 48
End: 2023-08-25
Payer: COMMERCIAL

## 2023-08-25 VITALS
DIASTOLIC BLOOD PRESSURE: 67 MMHG | SYSTOLIC BLOOD PRESSURE: 120 MMHG | OXYGEN SATURATION: 99 % | HEIGHT: 65 IN | TEMPERATURE: 97.2 F | HEART RATE: 94 BPM | WEIGHT: 153 LBS | BODY MASS INDEX: 25.49 KG/M2 | RESPIRATION RATE: 16 BRPM

## 2023-08-25 DIAGNOSIS — R07.81 RIB PAIN ON LEFT SIDE: Primary | ICD-10-CM

## 2023-08-25 PROCEDURE — 4004F PT TOBACCO SCREEN RCVD TLK: CPT | Performed by: FAMILY MEDICINE

## 2023-08-25 PROCEDURE — G8427 DOCREV CUR MEDS BY ELIG CLIN: HCPCS | Performed by: FAMILY MEDICINE

## 2023-08-25 PROCEDURE — G8419 CALC BMI OUT NRM PARAM NOF/U: HCPCS | Performed by: FAMILY MEDICINE

## 2023-08-25 PROCEDURE — 99213 OFFICE O/P EST LOW 20 MIN: CPT | Performed by: FAMILY MEDICINE

## 2023-08-25 RX ORDER — PROMETHAZINE HYDROCHLORIDE 12.5 MG/1
TABLET ORAL
COMMUNITY
Start: 2023-06-02

## 2023-08-25 RX ORDER — MINOCYCLINE HYDROCHLORIDE 100 MG/1
CAPSULE ORAL
COMMUNITY
Start: 2023-06-06

## 2023-08-25 RX ORDER — ONDANSETRON HYDROCHLORIDE 8 MG/1
TABLET, FILM COATED ORAL
COMMUNITY
Start: 2023-06-01

## 2023-08-25 RX ORDER — AZITHROMYCIN 250 MG/1
250 TABLET, FILM COATED ORAL DAILY
COMMUNITY

## 2023-08-25 RX ORDER — ATOVAQUONE 750 MG/5ML
750 SUSPENSION ORAL DAILY
COMMUNITY

## 2023-09-13 ENCOUNTER — HOSPITAL ENCOUNTER (OUTPATIENT)
Dept: CT IMAGING | Age: 48
Discharge: HOME | End: 2023-09-13
Payer: COMMERCIAL

## 2023-09-13 DIAGNOSIS — R07.81 RIB PAIN ON LEFT SIDE: ICD-10-CM

## 2023-09-13 PROCEDURE — 71250 CT THORAX DX C-: CPT

## 2023-09-25 DIAGNOSIS — K21.9 GASTROESOPHAGEAL REFLUX DISEASE, UNSPECIFIED WHETHER ESOPHAGITIS PRESENT: ICD-10-CM

## 2023-09-25 RX ORDER — OMEPRAZOLE 40 MG/1
40 CAPSULE, DELAYED RELEASE ORAL
Qty: 90 CAPSULE | Refills: 1 | Status: SHIPPED | OUTPATIENT
Start: 2023-09-25

## 2023-09-25 NOTE — TELEPHONE ENCOUNTER
Medication and Quantity requested:      omeprazole (PRILOSEC) 40 MG delayed release capsule [[id number]]    Qty 180    Last Visit    04/04/2023  Pharmacy and phone number updated in EPIC:  yes        [facility]

## 2023-09-28 DIAGNOSIS — R07.81 RIB PAIN: ICD-10-CM

## 2023-09-28 DIAGNOSIS — R07.89 CHEST WALL PAIN: ICD-10-CM

## 2023-09-28 NOTE — TELEPHONE ENCOUNTER
Medication:   Requested Prescriptions     Pending Prescriptions Disp Refills    loratadine (CLARITIN) 10 MG tablet 30 tablet 0     Sig: Take 1 tablet by mouth daily        Last Filled:      Patient Phone Number: [phone] (home) [phone] (work)    Last appt: 8/25/2023   Next appt: Visit date not found    Last OARRS:        No data to display

## 2023-09-28 NOTE — TELEPHONE ENCOUNTER
Medication and Quantity requested:    loratadine (CLARITIN) 10 MG tablet [[id number]]      Last Visit  08/25/2023      Pharmacy and phone number updated in EPIC:  yes    [facility]

## 2023-09-29 RX ORDER — LORATADINE 10 MG/1
10 TABLET ORAL DAILY
Qty: 30 TABLET | Refills: 0 | Status: SHIPPED | OUTPATIENT
Start: 2023-09-29 | End: 2023-10-29

## 2023-10-14 LAB — MAMMOGRAPHY, EXTERNAL: NORMAL

## 2024-01-16 DIAGNOSIS — Z79.890 HORMONE REPLACEMENT THERAPY: ICD-10-CM

## 2024-01-16 RX ORDER — ESTRADIOL 0.1 MG/G
CREAM VAGINAL
Qty: 42.5 G | Refills: 0 | OUTPATIENT
Start: 2024-01-16

## 2024-01-16 NOTE — TELEPHONE ENCOUNTER
Medication:   Requested Prescriptions     Pending Prescriptions Disp Refills    estradiol (ESTRACE) 0.1 MG/GM vaginal cream [Pharmacy Med Name: estradiol 0.01% (0.1 mg/gram) vaginal cream] 42.5 g 0     Sig: APPLY ONE-HALF GRAM VAGINALLY AT NIGHTTIME FOR TWO WEEKS, THEN TWO TO THREE TIMES WEEKLY THEREAFTER        Last Filled:  3/21/2023, 42.5g, 0    Patient Phone Number: [phone] (home) [phone] (work)    Last appt: 8/25/2023   Next appt: Visit date not found    Last OARRS:        No data to display

## 2024-02-26 DIAGNOSIS — Z79.890 HORMONE REPLACEMENT THERAPY: ICD-10-CM

## 2024-02-26 RX ORDER — ESTRADIOL 0.1 MG/G
CREAM VAGINAL
Qty: 42.5 G | Refills: 0 | Status: SHIPPED | OUTPATIENT
Start: 2024-02-26

## 2024-02-26 NOTE — TELEPHONE ENCOUNTER
Medication:   Requested Prescriptions     Pending Prescriptions Disp Refills    estradiol (ESTRACE) 0.1 MG/GM vaginal cream [Pharmacy Med Name: estradiol 0.01% (0.1 mg/gram) vaginal cream] 42.5 g 0     Sig: APPLY ONE-HALF GRAM VAGINALLY AT NIGHTTIME FOR TWO WEEKS, THEN TWO TO THREE TIMES WEEKLY THEREAFTER        Last Filled:      Patient Phone Number: [phone] (home) [phone] (work)    Last appt: 8/25/2023   Next appt: 4/5/2024    Last OARRS:        No data to display

## 2024-03-13 DIAGNOSIS — K21.9 GASTROESOPHAGEAL REFLUX DISEASE, UNSPECIFIED WHETHER ESOPHAGITIS PRESENT: ICD-10-CM

## 2024-03-13 RX ORDER — OMEPRAZOLE 40 MG/1
40 CAPSULE, DELAYED RELEASE ORAL
Qty: 30 CAPSULE | Refills: 1 | Status: SHIPPED | OUTPATIENT
Start: 2024-03-13

## 2024-04-12 ENCOUNTER — OFFICE VISIT (OUTPATIENT)
Dept: FAMILY MEDICINE CLINIC | Age: 49
End: 2024-04-12
Payer: COMMERCIAL

## 2024-04-12 VITALS
SYSTOLIC BLOOD PRESSURE: 126 MMHG | OXYGEN SATURATION: 98 % | TEMPERATURE: 98.4 F | BODY MASS INDEX: 26.39 KG/M2 | HEART RATE: 75 BPM | HEIGHT: 65 IN | WEIGHT: 158.4 LBS | DIASTOLIC BLOOD PRESSURE: 82 MMHG

## 2024-04-12 DIAGNOSIS — Z79.899 MEDICATION THERAPY CONTINUED: Primary | ICD-10-CM

## 2024-04-12 DIAGNOSIS — R22.33 AXILLARY MASS, BILATERAL: ICD-10-CM

## 2024-04-12 PROCEDURE — 4004F PT TOBACCO SCREEN RCVD TLK: CPT | Performed by: FAMILY MEDICINE

## 2024-04-12 PROCEDURE — G8419 CALC BMI OUT NRM PARAM NOF/U: HCPCS | Performed by: FAMILY MEDICINE

## 2024-04-12 PROCEDURE — 93000 ELECTROCARDIOGRAM COMPLETE: CPT | Performed by: FAMILY MEDICINE

## 2024-04-12 PROCEDURE — 99213 OFFICE O/P EST LOW 20 MIN: CPT | Performed by: FAMILY MEDICINE

## 2024-04-12 PROCEDURE — G8427 DOCREV CUR MEDS BY ELIG CLIN: HCPCS | Performed by: FAMILY MEDICINE

## 2024-04-12 RX ORDER — CROMOLYN SODIUM 100 MG/5ML
SOLUTION, CONCENTRATE ORAL
COMMUNITY
Start: 2024-03-27

## 2024-04-12 RX ORDER — AMOXICILLIN AND CLAVULANATE POTASSIUM 875; 125 MG/1; MG/1
TABLET, FILM COATED ORAL
COMMUNITY
Start: 2024-03-21

## 2024-04-12 RX ORDER — MEFLOQUINE HYDROCHLORIDE 250 MG/1
500 TABLET ORAL DAILY
COMMUNITY
Start: 2024-02-29

## 2024-04-12 RX ORDER — PROGESTERONE 100 MG/1
CAPSULE ORAL
COMMUNITY
Start: 2024-01-26

## 2024-04-12 RX ORDER — LORATADINE 10 MG/1
TABLET, ORALLY DISINTEGRATING ORAL
COMMUNITY
Start: 2024-02-28

## 2024-04-12 RX ORDER — ALPRAZOLAM 1 MG/1
TABLET ORAL
COMMUNITY
Start: 2024-03-19

## 2024-04-12 SDOH — ECONOMIC STABILITY: FOOD INSECURITY: WITHIN THE PAST 12 MONTHS, THE FOOD YOU BOUGHT JUST DIDN'T LAST AND YOU DIDN'T HAVE MONEY TO GET MORE.: NEVER TRUE

## 2024-04-12 SDOH — ECONOMIC STABILITY: FOOD INSECURITY: WITHIN THE PAST 12 MONTHS, YOU WORRIED THAT YOUR FOOD WOULD RUN OUT BEFORE YOU GOT MONEY TO BUY MORE.: NEVER TRUE

## 2024-04-12 SDOH — ECONOMIC STABILITY: INCOME INSECURITY: HOW HARD IS IT FOR YOU TO PAY FOR THE VERY BASICS LIKE FOOD, HOUSING, MEDICAL CARE, AND HEATING?: NOT HARD AT ALL

## 2024-04-12 ASSESSMENT — PATIENT HEALTH QUESTIONNAIRE - PHQ9
2. FEELING DOWN, DEPRESSED OR HOPELESS: SEVERAL DAYS
SUM OF ALL RESPONSES TO PHQ QUESTIONS 1-9: 2
SUM OF ALL RESPONSES TO PHQ9 QUESTIONS 1 & 2: 2
SUM OF ALL RESPONSES TO PHQ9 QUESTIONS 1 & 2: 2
SUM OF ALL RESPONSES TO PHQ QUESTIONS 1-9: 2
1. LITTLE INTEREST OR PLEASURE IN DOING THINGS: SEVERAL DAYS
SUM OF ALL RESPONSES TO PHQ QUESTIONS 1-9: 2
SUM OF ALL RESPONSES TO PHQ QUESTIONS 1-9: 2
2. FEELING DOWN, DEPRESSED OR HOPELESS: SEVERAL DAYS
1. LITTLE INTEREST OR PLEASURE IN DOING THINGS: SEVERAL DAYS

## 2024-04-12 NOTE — PROGRESS NOTES
[facility] Medicine  Clinic Note    Date: 4/12/2024                                               /Objective:     Chief Complaint   Patient presents with    ekg     High dose of azithromycin    Arm Pain     Pt c/o right arm pain and right shoulder pain also c/o swollen lymph node under right armpit. Tingling in the right arm. Symptoms have been going on since 2013       HPI  Pt needs EKG done per request of her Lyme disease doctor since she's taking a higher dose of Azithromycin. Is also taking Atovaquone, Augmentin, minocycline, mefloquine. Overall her symptoms are improving.    Pt has concerns for a lump in her R axilla over the last few months. Has been putting Clotrimazole on it, which seems to make is smaller but it doesn't go away completely. Seems to get bigger if she stops the Clotrimazole. Also has a lump for a while in L axilla but doesn't get sore like the right side.       There are no problems to display for this patient.      No past medical history on file.    Current Outpatient Medications   Medication Sig Dispense Refill    ALPRAZolam (XANAX) 1 MG tablet take 1 TABLET BY MOUTH EVERY 8 hours AS NEEDED FOR panic      amoxicillin-clavulanate (AUGMENTIN) 875-125 MG per tablet TAKE ONE TABLET BY MOUTH THREE TIMES DAILY with large meals. FOR complex biofilm infections      cromolyn (GASTROCROM) 100 MG/5ML solution       loratadine (CLARITIN REDITABS) 10 MG dissolvable tablet TAKE 4 TABLETS BY MOUTH ONCE DAILY IN THE MORNING      mefloquine (LARIAM) 250 MG tablet Take 2 tablets by mouth daily      progesterone (PROMETRIUM) 100 MG CAPS capsule take 3 capsules BY MOUTH every night, but lower if morning sedation      omeprazole (PRILOSEC) 40 MG delayed release capsule Take 1 capsule by mouth every morning (before breakfast) 30 capsule 1    estradiol (ESTRACE) 0.1 MG/GM vaginal cream APPLY ONE-HALF GRAM VAGINALLY AT NIGHTTIME FOR TWO WEEKS, THEN TWO TO THREE TIMES WEEKLY THEREAFTER 42.5 g 0

## 2024-04-26 ENCOUNTER — HOSPITAL ENCOUNTER (OUTPATIENT)
Dept: ULTRASOUND IMAGING | Age: 49
Discharge: HOME | End: 2024-04-26
Payer: COMMERCIAL

## 2024-04-26 DIAGNOSIS — Z79.899 MEDICATION THERAPY CONTINUED: ICD-10-CM

## 2024-04-26 DIAGNOSIS — R22.33 AXILLARY MASS, BILATERAL: ICD-10-CM

## 2024-04-26 PROCEDURE — 76882 US LMTD JT/FCL EVL NVASC XTR: CPT

## 2024-05-22 DIAGNOSIS — K21.9 GASTROESOPHAGEAL REFLUX DISEASE, UNSPECIFIED WHETHER ESOPHAGITIS PRESENT: ICD-10-CM

## 2024-05-22 RX ORDER — OMEPRAZOLE 40 MG/1
CAPSULE, DELAYED RELEASE ORAL
Qty: 30 CAPSULE | Refills: 1 | Status: SHIPPED | OUTPATIENT
Start: 2024-05-22

## 2024-09-18 DIAGNOSIS — K21.9 GASTROESOPHAGEAL REFLUX DISEASE, UNSPECIFIED WHETHER ESOPHAGITIS PRESENT: ICD-10-CM

## 2024-09-19 RX ORDER — OMEPRAZOLE 40 MG/1
CAPSULE, DELAYED RELEASE ORAL
Qty: 30 CAPSULE | Refills: 1 | Status: SHIPPED | OUTPATIENT
Start: 2024-09-19

## 2024-10-23 DIAGNOSIS — K21.9 GASTROESOPHAGEAL REFLUX DISEASE, UNSPECIFIED WHETHER ESOPHAGITIS PRESENT: ICD-10-CM

## 2024-10-23 RX ORDER — OMEPRAZOLE 40 MG/1
CAPSULE, DELAYED RELEASE ORAL
Qty: 30 CAPSULE | Refills: 1 | Status: SHIPPED | OUTPATIENT
Start: 2024-10-23

## 2024-10-23 NOTE — TELEPHONE ENCOUNTER
Medication:   Requested Prescriptions     Pending Prescriptions Disp Refills    omeprazole (PRILOSEC) 40 MG delayed release capsule 30 capsule 1     Sig: TAKE ONE CAPSULE BY MOUTH IN THE MORNING (before breakfast)        Last Filled:  09/19/2024    Patient Phone Number: [phone] (home) [phone] (work)    Last appt: 4/12/2024   Next appt: Visit date not found    Last OARRS:        No data to display